# Patient Record
Sex: FEMALE | Race: WHITE | Employment: OTHER | URBAN - METROPOLITAN AREA
[De-identification: names, ages, dates, MRNs, and addresses within clinical notes are randomized per-mention and may not be internally consistent; named-entity substitution may affect disease eponyms.]

---

## 2017-01-17 ENCOUNTER — OFFICE VISIT (OUTPATIENT)
Dept: OBGYN | Facility: CLINIC | Age: 62
End: 2017-01-17
Payer: COMMERCIAL

## 2017-01-17 VITALS
TEMPERATURE: 98.1 F | SYSTOLIC BLOOD PRESSURE: 122 MMHG | DIASTOLIC BLOOD PRESSURE: 60 MMHG | BODY MASS INDEX: 22.9 KG/M2 | WEIGHT: 141.8 LBS

## 2017-01-17 DIAGNOSIS — Z01.419 ENCOUNTER FOR GYNECOLOGICAL EXAMINATION WITHOUT ABNORMAL FINDING: ICD-10-CM

## 2017-01-17 DIAGNOSIS — N95.2 ATROPHIC VAGINITIS: Primary | ICD-10-CM

## 2017-01-17 DIAGNOSIS — N83.201 RIGHT OVARIAN CYST: ICD-10-CM

## 2017-01-17 PROCEDURE — 99396 PREV VISIT EST AGE 40-64: CPT | Performed by: OBSTETRICS & GYNECOLOGY

## 2017-01-17 NOTE — NURSING NOTE
"Chief Complaint   Patient presents with     Gyn Exam     No concerns.       Initial /60 mmHg  Temp(Src) 98.1  F (36.7  C) (Oral)  Wt 141 lb 12.8 oz (64.32 kg) Estimated body mass index is 22.9 kg/(m^2) as calculated from the following:    Height as of 6/30/16: 5' 6\" (1.676 m).    Weight as of this encounter: 141 lb 12.8 oz (64.32 kg).  BP completed using cuff size: regular  Christie Gomez MA      "

## 2017-01-17 NOTE — PROGRESS NOTES
Amber is a 61 year old  who presents for annual exam.   Postmenopausal.  She is having vaginal dryness, longstanding. No vaginal bleeding noted.     Besides routine health maintenance, she has no other health concerns today .  GYNECOLOGIC HISTORY:  She is sexually active with 1 male partner(s) and she is currently in monogamous relationship.    History sexually transmitted infections:No STD history  Estrogen replacement therapy: Yes -  Vaginal only    History of abnormal Pap smear: NO - age 30-65 PAP every 5 years with negative HPV co-testing recommended  Family history of breast CA: No  Family history of uterine/ovarian CA: No  Family history of colon CA: No    HEALTH MAINTENANCE:  Last Mammogram:  . History of abnormal Mammo: No  Pap; (PAP      NIL   2016  PAP      NIL   10/15/2013  PAP      NIL   2010 )    HISTORY:  Obstetric History       T0      TAB1   SAB0   E0   M0   L0       # Outcome Date GA Lbr Jaylon/2nd Weight Sex Delivery Anes PTL Lv   1 TAB      TAB   N        Past Medical History   Diagnosis Date     Irritable bowel syndrome      stable     Past Surgical History   Procedure Laterality Date     C nonspecific procedure       septoplasty and polypectomy     C nonspecific procedure       wisdom teeth     Tubal ligation  age 28     Family History   Problem Relation Age of Onset     DIABETES Mother      B:192     Hypertension Mother      Musculoskeletal Disorder Mother      spine problems     Family History Negative Father      B:192     Psychotic Disorder Brother      bi-polar     C.A.D. Brother      Aortic aneurysm     Alzheimer Disease Mother 84     Social History     Social History     Marital Status:      Spouse Name: Michael     Number of Children: 0     Years of Education: N/A     Occupational History      None      Social History Main Topics     Smoking status: Former Smoker     Smokeless tobacco: Never Used      Comment: quit      Alcohol Use: No     Drug  Use: No     Sexual Activity:     Partners: Male     Other Topics Concern      Service No     Blood Transfusions No     Caffeine Concern No     Occupational Exposure No     Hobby Hazards No     Sleep Concern No     Stress Concern No     Weight Concern No     Special Diet Yes     weight watchers     Back Care No     Exercise Yes     4-5 days per week     Bike Helmet Yes     Seat Belt Yes     Self-Exams Yes     Social History Narrative    Living arrangements - the patient lives with her spouse.        Current outpatient prescriptions:      estradiol (VAGIFEM) 10 MCG TABS, Place 1 tablet (10 mcg) vaginally every 3 days, Disp: 24 tablet, Rfl: 3     oxybutynin (OXYTROL) 3.9 MG/24HR, Place 1 patch onto the skin twice a week Replaces her patch every 4 days., Disp: 24 patch, Rfl: 11     Cyanocobalamin (B-12 SUPER STRENGTH) 5000 MCG/ML LIQD, Place 1 mL under the tongue daily FOR VITAMIN B12 SUPPLEMENTATION, PLEASE PLACE UNDER THE TONGUE, Disp: 2 Bottle, Rfl: PRN     cholecalciferol (VITAMIN D3) 44466 UNITS capsule, Take 1 capsule (50,000 Units) by mouth every 30 days INDICATION:FOR VITAMIN D SUPPLEMENTATION, Disp: 40 capsule, Rfl: 0     calcium-vitamin D-vitamin K (VIACTIV) 500-500-40 MG-UNT-MCG CHEW, Take 1 tablet by mouth daily, Disp: , Rfl:      Acetaminophen (TYLENOL EXTRA STRENGTH PO), Take 1 tablet by mouth as needed 2 bid, Disp: , Rfl:      BENADRYL 25 MG OR CAPS, prn, Disp: , Rfl:      metroNIDAZOLE (FLAGYL) 500 MG tablet, Take 1 tablet (500 mg) by mouth 2 times daily, Disp: 14 tablet, Rfl: 0     Turmeric Curcumin 500 MG CAPS, Take 1 capsule by mouth 2 times daily INDICATION: ARTHRITIS, Disp: 100 capsule, Rfl: PRN     LACTOBACILLUS EXTRA STRENGTH CAPS, Take 1 capsule by mouth daily INDICATION: TO RESTORE GOOD INTESTINAL BACTERIA, Disp: 30 capsule, Rfl: prn  Allergies   Allergen Reactions     Levaquin Hives     Levaquin induced fixed drug eruption.  Biopsy proven by dermatologist.       Cyclobenzaprine Hcl       drowsiness     Seafood Nausea and Vomiting       Past medical, surgical, social and family history were reviewed and updated in EPIC.    ROS:  12 point review of systems negative other than symptoms noted below.      EXAM:  /60 mmHg  Temp(Src) 98.1  F (36.7  C) (Oral)  Wt 141 lb 12.8 oz (64.32 kg)   BMI: Body mass index is 22.9 kg/(m^2).  Constitutional: healthy, alert and no distress  Respiratory: Negative.   Breast: Breasts reveal mild symmetric fibrocystic densities, but there are no dominant, discrete, fixed or suspicious masses found.  Gastrointestinal: Abdomen soft, non-tender, non-distended. No masses, organomegaly  Vulva:  No external lesions, normal female hair distribution, no inguinal adenopathy.    Urethra:  Midline, non-tender, well supported, no discharge  Vagina:  Atrophic, no abnormal discharge, no lesions  Cervix: nontender to palpation  Uterus:  anteverted, smooth contour, without enlargement, mobile, and without tenderness  Ovaries:  No masses appreciated, non-tender, mobile  Rectal Exam: deferred  Musculoskeletal: extremities normal  Skin: no suspicious lesions or rashes  Psychiatric: Affect appropriate, cooperative, mentation appears normal.     COUNSELING:   Reviewed preventive health counseling, as reflected in patient instructions       Healthy diet/nutrition       (Beata)menopause management   reports that she has quit smoking. She has never used smokeless tobacco.        FRAX Risk Assessment  ASSESSMENT:  61 year old  with satisfactory annual exam  (N95.2) Atrophic vaginitis  (primary encounter diagnosis)  Comment:   Plan: Continue vagifem. [place] is still painful, and she has dilators but has not used them consistently. Discussed the use of dilators again, also lubricant and the need for dilator use or intercourse frequently to improve elasticity of the vaginal epithelium, along with continued use of vagifem. Offered referral for pelvic floor physical therapy as well,  she will consider.    (Z01.419) Encounter for gynecological examination without abnormal finding  Comment:   Plan: normal exam. Long history of normal paps.    (N83.201) Right ovarian cyst  Comment:   Plan: Reviewed her ultrasound from Aug 2016. No change from prior ultrasound, showing a small, less than 1.5 cm simple cyst. Reviewed the literature indicating that this has almost no chance of being cancer, and as thus, does not need to be followed regularly. Certainly if she develops new symptoms, consideration to repeating a pelvic ultrasound should be given.    Lolis Solano MD

## 2017-04-05 ENCOUNTER — TELEPHONE (OUTPATIENT)
Dept: INTERNAL MEDICINE | Facility: CLINIC | Age: 62
End: 2017-04-05

## 2017-04-05 ENCOUNTER — MYC MEDICAL ADVICE (OUTPATIENT)
Dept: OBGYN | Facility: CLINIC | Age: 62
End: 2017-04-05

## 2017-04-05 DIAGNOSIS — Z13.820 SCREENING FOR OSTEOPOROSIS: ICD-10-CM

## 2017-04-05 DIAGNOSIS — Z83.3 FAMILY HISTORY OF DIABETES MELLITUS: ICD-10-CM

## 2017-04-05 DIAGNOSIS — E55.9 VITAMIN D DEFICIENCY: Primary | ICD-10-CM

## 2017-04-05 DIAGNOSIS — E78.5 HYPERLIPIDEMIA LDL GOAL <130: ICD-10-CM

## 2017-04-05 DIAGNOSIS — E53.8 VITAMIN B12 DEFICIENCY (NON ANEMIC): ICD-10-CM

## 2017-04-05 DIAGNOSIS — N32.89 BLADDER SPASMS: ICD-10-CM

## 2017-04-05 NOTE — TELEPHONE ENCOUNTER
Reason for Call: Request for an order or referral:    Order or referral being requested: DEXA scan, blood work    Date needed: at your convenience    Has the patient been seen by the PCP for this problem? YES    Additional comments[facility] no orders in chart. Please call pt when orders are placed.    Phone number Patient can be reached at:  Home number on file [phone] (home)    Best Time:  anytime    Can we leave a detailed message on this number?  YES    Call taken on 4/5/2017 at 11:54 AM by Annette Drake

## 2017-04-05 NOTE — TELEPHONE ENCOUNTER
Pt asking for a letter to be sent to her insurance for Oxytrol.  Last time med was ordered by Dr. Barreto at [facility] (I believe).    Oxytol       Last Written Prescription Date:  8/11/16  Last Fill Quantity: 24 patches,   # refills: 11  Last Office Visit with [facility] or  [facility] prescribing provider: 1/17/17 with Dr. Solano  Future Office visit:       Routing refill request to provider for review/approval because:  Drug not on the [facility], [facility] or  [facility] refill protocol or controlled substance    Dr. Solano do you want to take this one or should we send to  Provider.  I don't see a letter from Dr Salcedo.  But I did find an encounter 9/21/15 under Dr Antunez about this.

## 2017-04-06 NOTE — TELEPHONE ENCOUNTER
This came as a prescription refill, but the request is for a letter. I'm assuming as it was renewed in August for a year that she doesn't need a refill?    OK to send letter stating that this is the preferred treatment for her as it works best with the fewest side effects. Thanks.    Lolis Solano MD

## 2017-04-07 ENCOUNTER — MYC MEDICAL ADVICE (OUTPATIENT)
Dept: OBGYN | Facility: CLINIC | Age: 62
End: 2017-04-07

## 2017-04-07 DIAGNOSIS — N32.89 BLADDER SPASMS: ICD-10-CM

## 2017-04-08 ENCOUNTER — MYC MEDICAL ADVICE (OUTPATIENT)
Dept: INTERNAL MEDICINE | Facility: CLINIC | Age: 62
End: 2017-04-08

## 2017-04-10 NOTE — TELEPHONE ENCOUNTER
Per epic med list last rx was written 08/11/16 by PCP. When completing PA requests prescribing provider's name and information. Dr. Solano - Do you want to write rx so that this clinic can do the PA for the pt? Last OV with Dr. Solano: 01/17/17.    Please advise, thanks.    PA started on [email or web address], but no sent to insurance until response received from MD.

## 2017-04-12 ENCOUNTER — MYC MEDICAL ADVICE (OUTPATIENT)
Dept: OBGYN | Facility: CLINIC | Age: 62
End: 2017-04-12

## 2017-04-12 NOTE — TELEPHONE ENCOUNTER
"PA started on [email or web address] requiring provider signature. Attempted to print for MD signature, but unable to. To sign go to: [email or web address], username: [email or web address], password: [email or web address].    To find pt's PA click \"enter key\" on left side of screen and enter: pt's key \"[id number]\". This will bring you to pt's PA. Request for provider signature on the bottom of the page.  "

## 2017-04-12 NOTE — TELEPHONE ENCOUNTER
PA sent to insurance via covermymeds. If any questions about PA submission, contact [employer] at [phone].

## 2017-04-12 NOTE — TELEPHONE ENCOUNTER
Received fax indication med was approved for 04/12/17-04/12/18.    [facility]

## 2017-04-27 ENCOUNTER — RADIANT APPOINTMENT (OUTPATIENT)
Dept: BONE DENSITY | Facility: CLINIC | Age: 62
End: 2017-04-27
Attending: INTERNAL MEDICINE
Payer: COMMERCIAL

## 2017-04-27 DIAGNOSIS — E53.8 VITAMIN B12 DEFICIENCY (NON ANEMIC): ICD-10-CM

## 2017-04-27 DIAGNOSIS — Z13.820 SCREENING FOR OSTEOPOROSIS: ICD-10-CM

## 2017-04-27 DIAGNOSIS — E78.5 HYPERLIPIDEMIA LDL GOAL <130: ICD-10-CM

## 2017-04-27 DIAGNOSIS — E55.9 VITAMIN D DEFICIENCY: ICD-10-CM

## 2017-04-27 LAB
ALBUMIN SERPL-MCNC: 3.5 G/DL (ref 3.4–5)
ALP SERPL-CCNC: 92 U/L (ref 40–150)
ALT SERPL W P-5'-P-CCNC: 14 U/L (ref 0–50)
ANION GAP SERPL CALCULATED.3IONS-SCNC: 11 MMOL/L (ref 3–14)
AST SERPL W P-5'-P-CCNC: 11 U/L (ref 0–45)
BILIRUB SERPL-MCNC: 0.6 MG/DL (ref 0.2–1.3)
BUN SERPL-MCNC: 15 MG/DL (ref 7–30)
CALCIUM SERPL-MCNC: 9.3 MG/DL (ref 8.5–10.1)
CHLORIDE SERPL-SCNC: 107 MMOL/L (ref 94–109)
CHOLEST SERPL-MCNC: 190 MG/DL
CO2 SERPL-SCNC: 25 MMOL/L (ref 20–32)
CREAT SERPL-MCNC: 0.72 MG/DL (ref 0.52–1.04)
DEPRECATED CALCIDIOL+CALCIFEROL SERPL-MC: 56 UG/L (ref 20–75)
ERYTHROCYTE [DISTWIDTH] IN BLOOD BY AUTOMATED COUNT: 13.6 % (ref 10–15)
GFR SERPL CREATININE-BSD FRML MDRD: 81 ML/MIN/1.7M2
GLUCOSE SERPL-MCNC: 85 MG/DL (ref 70–99)
HCT VFR BLD AUTO: 38.9 % (ref 35–47)
HDLC SERPL-MCNC: 52 MG/DL
HGB BLD-MCNC: 12.5 G/DL (ref 11.7–15.7)
LDLC SERPL CALC-MCNC: 119 MG/DL
MCH RBC QN AUTO: 29.2 PG (ref 26.5–33)
MCHC RBC AUTO-ENTMCNC: 32.1 G/DL (ref 31.5–36.5)
MCV RBC AUTO: 91 FL (ref 78–100)
NONHDLC SERPL-MCNC: 138 MG/DL
PLATELET # BLD AUTO: 286 10E9/L (ref 150–450)
POTASSIUM SERPL-SCNC: 4.2 MMOL/L (ref 3.4–5.3)
PROT SERPL-MCNC: 7.3 G/DL (ref 6.8–8.8)
RBC # BLD AUTO: 4.28 10E12/L (ref 3.8–5.2)
SODIUM SERPL-SCNC: 143 MMOL/L (ref 133–144)
T4 FREE SERPL-MCNC: 0.97 NG/DL (ref 0.76–1.46)
TRIGL SERPL-MCNC: 95 MG/DL
TSH SERPL DL<=0.05 MIU/L-ACNC: 4.49 MU/L (ref 0.4–4)
WBC # BLD AUTO: 6.5 10E9/L (ref 4–11)

## 2017-04-27 PROCEDURE — 36415 COLL VENOUS BLD VENIPUNCTURE: CPT | Performed by: INTERNAL MEDICINE

## 2017-04-27 PROCEDURE — 80053 COMPREHEN METABOLIC PANEL: CPT | Performed by: INTERNAL MEDICINE

## 2017-04-27 PROCEDURE — 82306 VITAMIN D 25 HYDROXY: CPT | Performed by: INTERNAL MEDICINE

## 2017-04-27 PROCEDURE — 85027 COMPLETE CBC AUTOMATED: CPT | Performed by: INTERNAL MEDICINE

## 2017-04-27 PROCEDURE — 84443 ASSAY THYROID STIM HORMONE: CPT | Performed by: INTERNAL MEDICINE

## 2017-04-27 PROCEDURE — 80061 LIPID PANEL: CPT | Performed by: INTERNAL MEDICINE

## 2017-04-27 PROCEDURE — 77080 DXA BONE DENSITY AXIAL: CPT | Performed by: INTERNAL MEDICINE

## 2017-04-27 PROCEDURE — 84439 ASSAY OF FREE THYROXINE: CPT | Performed by: INTERNAL MEDICINE

## 2017-05-01 ENCOUNTER — MYC MEDICAL ADVICE (OUTPATIENT)
Dept: INTERNAL MEDICINE | Facility: CLINIC | Age: 62
End: 2017-05-01

## 2017-05-01 NOTE — PROGRESS NOTES
Dear Amber,   Your recent test results were reviewed and are within acceptable limits except for your TSH (thyroid-stimulating hormone) which is a marker of how well the thyroid gland is functioning. It is slightly high but your actual thyroid hormone level is within normal limits. Given these findings the way to handle this would be to continue to monitor your thyroid levels but if you're experiencing any symptoms of fatigue, weight gain or constipation it may be a sign that your thyroid is not functioning as well as it should and you may require low-dose thyroid replacement. Please let me know if this is the case, and do not hesitate to contact me with any questions or concerns.  Stay healthy!    Regards,  Dr. Susan Virk [facility]

## 2017-05-03 NOTE — PROGRESS NOTES
Dear Amber,   Your recent Dexa scan results were reviewed and thinning of the bones - osteopenia. Please continue with current treatment ie Calcium and Vitamin D, weight bearing exercise , and follow up plans as discussed.  A repeat DEXA is recommended in 2 years.      Stay healthy!  Dr. SHELBIE Kline

## 2017-08-18 DIAGNOSIS — Z79.890 POSTMENOPAUSAL HRT (HORMONE REPLACEMENT THERAPY): ICD-10-CM

## 2017-08-18 DIAGNOSIS — N94.10 FEMALE DYSPAREUNIA: ICD-10-CM

## 2017-08-18 RX ORDER — ESTRADIOL 10 UG/1
TABLET VAGINAL
Qty: 16 TABLET | Refills: 2 | Status: SHIPPED | OUTPATIENT
Start: 2017-08-18 | End: 2018-01-10

## 2017-08-18 NOTE — TELEPHONE ENCOUNTER
Yuvafem vaginal      Last Written Prescription Date:  na  Last Fill Quantity: na,   # refills: na  Last Office Visit with [facility] or [facility] prescribing provider: 06/30/16  Future Office visit:   0    Routing refill request to provider for review/approval because:  Drug not active on patient's medication list

## 2017-12-05 ENCOUNTER — HOSPITAL ENCOUNTER (OUTPATIENT)
Dept: MAMMOGRAPHY | Facility: CLINIC | Age: 62
Discharge: HOME | End: 2017-12-05
Attending: OBSTETRICS & GYNECOLOGY | Admitting: OBSTETRICS & GYNECOLOGY
Payer: COMMERCIAL

## 2017-12-05 DIAGNOSIS — Z12.31 ENCOUNTER FOR SCREENING MAMMOGRAM FOR HIGH-RISK PATIENT: ICD-10-CM

## 2017-12-05 PROCEDURE — 77063 BREAST TOMOSYNTHESIS BI: CPT

## 2017-12-05 PROCEDURE — G0202 SCR MAMMO BI INCL CAD: HCPCS

## 2018-01-10 DIAGNOSIS — Z79.890 POSTMENOPAUSAL HRT (HORMONE REPLACEMENT THERAPY): ICD-10-CM

## 2018-01-10 DIAGNOSIS — N94.10 FEMALE DYSPAREUNIA: ICD-10-CM

## 2018-01-10 NOTE — TELEPHONE ENCOUNTER
Requested Prescriptions   Pending Prescriptions Disp Refills     estradiol (YUVAFEM) 10 MCG TABS vaginal tablet        Last Written Prescription Date:  08/18/17  Last Fill Quantity: 16,  # refills: 2   Last [facility] Visit with [facility] or [facility]:  06/30/16   Future Office Visit:  01/18/18  Next 5 appointments (look out 90 days)     Jan 18, 2018  9:30 AM CST   PHYSICAL with Lolis Solano MD   [facility]    [address]  [facility] [id number] [phone]                16 tablet 2    There is no refill protocol information for this order      Last mammo 12/05/17

## 2018-01-17 RX ORDER — ESTRADIOL 10 UG/1
INSERT VAGINAL
Qty: 16 TABLET | Refills: 0 | Status: SHIPPED | OUTPATIENT
Start: 2018-01-17 | End: 2018-03-03

## 2018-01-18 ENCOUNTER — OFFICE VISIT (OUTPATIENT)
Dept: OBGYN | Facility: CLINIC | Age: 63
End: 2018-01-18
Payer: COMMERCIAL

## 2018-01-18 VITALS
DIASTOLIC BLOOD PRESSURE: 58 MMHG | HEART RATE: 60 BPM | WEIGHT: 142 LBS | SYSTOLIC BLOOD PRESSURE: 100 MMHG | HEIGHT: 66 IN | BODY MASS INDEX: 22.82 KG/M2

## 2018-01-18 DIAGNOSIS — N95.2 ATROPHIC VAGINITIS: Primary | ICD-10-CM

## 2018-01-18 DIAGNOSIS — Z01.419 ENCOUNTER FOR GYNECOLOGICAL EXAMINATION WITHOUT ABNORMAL FINDING: ICD-10-CM

## 2018-01-18 PROCEDURE — 99214 OFFICE O/P EST MOD 30 MIN: CPT | Performed by: OBSTETRICS & GYNECOLOGY

## 2018-01-18 RX ORDER — ESTRADIOL 0.1 MG/G
CREAM VAGINAL
Qty: 42.5 G | Refills: 1 | Status: SHIPPED | OUTPATIENT
Start: 2018-01-18 | End: 2018-05-16

## 2018-01-18 NOTE — PROGRESS NOTES
Amber is a 62 year old  who presents for annual exam.   Postmenopausal.  She is having vaginal dryness and pain with intercourse, not able to have penetrative sex at this point. No vaginal bleeding noted.     Besides routine health maintenance,  she would like to discuss atrophic vaginitis and dyspareunia. She would really like to be able to have intercourse again with her . She has tried vagifem twice a week and maintains that now, but hasn't noticed much improvement although also things are not worse. She has used dilators but finds this difficult and is just really uncomfortable with the whole idea.  GYNECOLOGIC HISTORY:  She is sexually active with 1 male partner(s) and she is currently in monogamous relationship.    History sexually transmitted infections:No STD history  Estrogen replacement therapy: Yes -  Vaginal only    History of abnormal Pap smear: NO - age 30-65 PAP every 5 years with negative HPV co-testing recommended  Family history of breast CA: No  Family history of uterine/ovarian CA: No  Family history of colon CA: No    HEALTH MAINTENANCE:  Last Mammogram: up to date . History of abnormal Mammo: No  Pap; (  Lab Results   Component Value Date    PAP NIL 2016    PAP NIL 10/15/2013    PAP NIL 2010    )    HISTORY:  Obstetric History       T0      L0     SAB0   TAB1   Ectopic0   Multiple0   Live Births0       # Outcome Date GA Lbr Jaylon/2nd Weight Sex Delivery Anes PTL Lv   1 TAB      TAB   DEC        Past Medical History:   Diagnosis Date     Irritable bowel syndrome     stable     Past Surgical History:   Procedure Laterality Date     C NONSPECIFIC PROCEDURE      septoplasty and polypectomy     C NONSPECIFIC PROCEDURE      wisdom teeth     TUBAL LIGATION  age 28     Family History   Problem Relation Age of Onset     DIABETES Mother      B:192     Hypertension Mother      Musculoskeletal Disorder Mother      spine problems     Alzheimer Disease Mother 84      Family History Negative Father      B:1927     Psychotic Disorder Brother      bi-polar     C.A.D. Brother      Aortic aneurysm     Social History     Social History     Marital status:      Spouse name: Michael     Number of children: 0     Years of education: N/A     Occupational History      None      Social History Main Topics     Smoking status: Former Smoker     Smokeless tobacco: Never Used      Comment: quit 1980     Alcohol use No     Drug use: No     Sexual activity: Yes     Partners: Male     Other Topics Concern      Service No     Blood Transfusions No     Caffeine Concern No     Occupational Exposure No     Hobby Hazards No     Sleep Concern No     Stress Concern No     Weight Concern No     Special Diet Yes     weight watchers     Back Care No     Exercise Yes     4-5 days per week     Bike Helmet Yes     Seat Belt Yes     Self-Exams Yes     Social History Narrative    Living arrangements - the patient lives with her spouse.        Current Outpatient Prescriptions:      estradiol (YUVAFEM) 10 MCG TABS vaginal tablet, place 1 tablet vaginally every 3 days, Disp: 16 tablet, Rfl: 0     oxybutynin (OXYTROL) 3.9 MG/24HR BIW patch, Place 1 patch onto the skin twice a week Replaces her patch every 4 days., Disp: 24 patch, Rfl: 11     Cyanocobalamin (B-12 SUPER STRENGTH) 5000 MCG/ML LIQD, Place 1 mL under the tongue daily FOR VITAMIN B12 SUPPLEMENTATION, PLEASE PLACE UNDER THE TONGUE, Disp: 2 Bottle, Rfl: PRN     cholecalciferol (VITAMIN D3) 21594 UNITS capsule, Take 1 capsule (50,000 Units) by mouth every 30 days INDICATION:FOR VITAMIN D SUPPLEMENTATION, Disp: 40 capsule, Rfl: 0     calcium-vitamin D-vitamin K (VIACTIV) 500-500-40 MG-UNT-MCG CHEW, Take 1 tablet by mouth daily, Disp: , Rfl:      Acetaminophen (TYLENOL EXTRA STRENGTH PO), Take 1 tablet by mouth as needed 2 bid, Disp: , Rfl:      metroNIDAZOLE (FLAGYL) 500 MG tablet, Take 1 tablet (500 mg) by mouth 2 times daily (Patient not  "taking: Reported on 1/18/2018), Disp: 14 tablet, Rfl: 0     Turmeric Curcumin 500 MG CAPS, Take 1 capsule by mouth 2 times daily INDICATION: ARTHRITIS (Patient not taking: Reported on 1/18/2018), Disp: 100 capsule, Rfl: PRN     LACTOBACILLUS EXTRA STRENGTH CAPS, Take 1 capsule by mouth daily INDICATION: TO RESTORE GOOD INTESTINAL BACTERIA (Patient not taking: Reported on 1/18/2018), Disp: 30 capsule, Rfl: prn     BENADRYL 25 MG OR CAPS, prn (Patient not taking: Reported on 1/18/2018), Disp: , Rfl:   Allergies   Allergen Reactions     Levaquin Hives     Levaquin induced fixed drug eruption.  Biopsy proven by dermatologist.       Cyclobenzaprine Hcl      drowsiness     Seafood Nausea and Vomiting       Past medical, surgical, social and family history were reviewed and updated in EPIC.    ROS:  12 point review of systems negative other than symptoms noted below.      EXAM:  /58 (BP Location: Right arm, Patient Position: Sitting, Cuff Size: Adult Regular)  Pulse 60  Ht 5' 6\" (1.676 m)  Wt 142 lb (64.4 kg)  Breastfeeding? No  BMI 22.92 kg/m2   BMI: Body mass index is 22.92 kg/(m^2).  Constitutional: healthy, alert and no distress  Head: Normocephalic. No masses, lesions, tenderness or abnormalities  Neck: Neck supple. Trachea midline. No adenopathy. Thyroid symmetric, normal size.   Cardiovascular:  No edema   Respiratory: Negative. Breathing is unlabored.  Breast: No nodularity, asymmetry or nipple discharge bilaterally.  Gastrointestinal: Abdomen soft, non-tender, non-distended. No masses, organomegaly  Vulva:  No external lesions, normal female hair distribution, no inguinal adenopathy.    Urethra:  Midline, non-tender, well supported, no discharge  Vagina:  Atrophic, no abnormal discharge, no lesions  Cervix: no lesions, no discharge  Uterus:  anteverted, smooth contour, without enlargement, mobile, and without tenderness  Ovaries:  No masses appreciated, non-tender, mobile  Rectal Exam: " deferred  Musculoskeletal: extremities normal  Skin: no suspicious lesions or rashes  Psychiatric: Affect appropriate, cooperative, mentation appears normal.     COUNSELING:   Reviewed preventive health counseling, as reflected in patient instructions       (Beata)menopause management       Advance Care Planning   reports that she has quit smoking. She has never used smokeless tobacco.          FRAX Risk Assessment  ASSESSMENT:  62 year old  with satisfactory annual exam  (N95.2) Atrophic vaginitis  (primary encounter diagnosis)  Comment:   Plan: estradiol (ESTRACE VAGINAL) 0.1 MG/GM cream        Will add estrace daily for 4 weeks to ramp up estrogen in the vaginal area, and then go back to vagifem but at 3 times a week instead of twice.    Also discussed options of physical therapy for help with dilators and for other ideas and laser treatment or radiofrequency treatments like Rajani Karime or Thermiva. These names were given to her as well. She would like to begin with medications first.    (Z01.419) Encounter for gynecological examination without abnormal finding  Comment:   Plan: History of normal paps--will not need further paps as she will be due in  after age 63.    Lolis Solano MD

## 2018-01-18 NOTE — NURSING NOTE
"Chief Complaint   Patient presents with     Physical     Annual exam. Pap up to date.        Initial /58 (BP Location: Right arm, Patient Position: Sitting, Cuff Size: Adult Regular)  Pulse 60  Ht 5' 6\" (1.676 m)  Wt 142 lb (64.4 kg)  Breastfeeding? No  BMI 22.92 kg/m2 Estimated body mass index is 22.92 kg/(m^2) as calculated from the following:    Height as of this encounter: 5' 6\" (1.676 m).    Weight as of this encounter: 142 lb (64.4 kg).  BP completed using cuff size: regular        The following HM Due: NONE      The following patient reported/Care Every where data was sent to:  P ABSTRACT QUALITY INITIATIVES [[id number]]        patient has appointment for today. Lydia Mcqueen LPN                "

## 2018-01-18 NOTE — MR AVS SNAPSHOT
"              After Visit Summary   1/18/2018    Amber Black    MRN: [id number]           Patient Information     Date Of Birth          1955        Visit Information        Provider Department      1/18/2018 9:30 AM Lolis Solano MD [facility]        Today's Diagnoses     Atrophic vaginitis    -  1    Encounter for gynecological examination without abnormal finding           Follow-ups after your visit        Who to contact     If you have questions or need follow up information about today's clinic visit or your schedule please contact [facility] directly at [phone].  Normal or non-critical lab and imaging results will be communicated to you by [employer], letter or phone within 4 business days after the clinic has received the results. If you do not hear from us within 7 days, please contact the clinic through [employer] or phone. If you have a critical or abnormal lab result, we will notify you by phone as soon as possible.  Submit refill requests through [employer] or call your pharmacy and they will forward the refill request to us. Please allow 3 business days for your refill to be completed.          Additional Information About Your Visit        MyChart Information     [employer] gives you secure access to your electronic health record. If you see a primary care provider, you can also send messages to your care team and make appointments. If you have questions, please call your primary care clinic.  If you do not have a primary care provider, please call [phone] and they will assist you.        Care EveryWhere ID     This is your Care EveryWhere ID. This could be used by other organizations to access your [place] medical records  [id number]        Your Vitals Were     Pulse Height Breastfeeding? BMI (Body Mass Index)          60 5' 6\" (1.676 m) No 22.92 kg/m2         Blood Pressure from Last 3 Encounters:   01/18/18 100/58   01/17/17 122/60   06/30/16 110/70    " Weight from Last 3 Encounters:   01/18/18 142 lb (64.4 kg)   01/17/17 141 lb 12.8 oz (64.3 kg)   06/30/16 141 lb 11.2 oz (64.3 kg)              Today, you had the following     No orders found for display         Today's Medication Changes          These changes are accurate as of: 1/18/18  2:23 PM.  If you have any questions, ask your nurse or doctor.               These medicines have changed or have updated prescriptions.        Dose/Directions    * estradiol 10 MCG Tabs vaginal tablet   Commonly known as:  YUVAFEM   This may have changed:  Another medication with the same name was added. Make sure you understand how and when to take each.   Used for:  Postmenopausal HRT (hormone replacement therapy), Female dyspareunia   Changed by:  Susan Davis MD        place 1 tablet vaginally every 3 days   Quantity:  16 tablet   Refills:  0       * estradiol 0.1 MG/GM cream   Commonly known as:  ESTRACE VAGINAL   This may have changed:  You were already taking a medication with the same name, and this prescription was added. Make sure you understand how and when to take each.   Used for:  Atrophic vaginitis   Changed by:  Lolis Solano MD        1 gm pv nightly at bedtime for 4 weeks.   Quantity:  42.5 g   Refills:  1       * Notice:  This list has 2 medication(s) that are the same as other medications prescribed for you. Read the directions carefully, and ask your doctor or other care provider to review them with you.         Where to get your medicines      These medications were sent to [id number] - [place], [place] - [address]  [address] [place] [address]     Phone:  [phone]     estradiol 0.1 MG/GM cream                Primary Care Provider Office Phone # Fax #    Susan Davis [phone]       XXX RESIGNED XXX  [facility] [address]        Equal Access to Services     MARCELA MELENDEZ AH: Holly Garcia, elijah vale, lionel south,  romulo melgar fran lorenzo'aan ah. So [facility] [phone].    ATENCIÓN: Si habla christiano, tiene a sal disposición servicios gratuitos de asistencia lingüística. Monik smith [phone].    We comply with applicable federal civil rights laws and [place] laws. We do not discriminate on the basis of race, color, national origin, age, disability, sex, sexual orientation, or gender identity.            Thank you!     Thank you for choosing [facility]  for your care. Our goal is always to provide you with excellent care. Hearing back from our patients is one way we can continue to improve our services. Please take a few minutes to complete the written survey that you may receive in the mail after your visit with us. Thank you!             Your Updated Medication List - Protect others around you: Learn how to safely use, store and throw away your medicines at www.disposemymeds.org.          This list is accurate as of: 1/18/18  2:23 PM.  Always use your most recent med list.                   Brand Name Dispense Instructions for use Diagnosis    BENADRYL 25 MG capsule   Generic drug:  diphenhydrAMINE      prn        calcium-vitamin D-vitamin K 500-500-40 MG-UNT-MCG Chew    VIACTIV     Take 1 tablet by mouth daily        cholecalciferol 12466 UNITS capsule    VITAMIN D3    40 capsule    Take 1 capsule (50,000 Units) by mouth every 30 days INDICATION:FOR VITAMIN D SUPPLEMENTATION    Vitamin D deficiency       Cyanocobalamin 5000 MCG/ML Liqd    B-12 SUPER STRENGTH    2 Bottle    Place 1 mL under the tongue daily FOR VITAMIN B12 SUPPLEMENTATION, PLEASE PLACE UNDER THE TONGUE    Vitamin B12 deficiency (non anemic)       * estradiol 10 MCG Tabs vaginal tablet    YUVAFEM    16 tablet    place 1 tablet vaginally every 3 days    Postmenopausal HRT (hormone replacement therapy), Female dyspareunia       * estradiol 0.1 MG/GM cream    ESTRACE VAGINAL    42.5 g    1 gm pv nightly at bedtime for 4 weeks.     Atrophic vaginitis       LACTOBACILLUS EXTRA STRENGTH Caps     30 capsule    Take 1 capsule by mouth daily INDICATION: TO RESTORE GOOD INTESTINAL BACTERIA    Flatulence, eructation, and gas pain       metroNIDAZOLE 500 MG tablet    FLAGYL    14 tablet    Take 1 tablet (500 mg) by mouth 2 times daily    Vaginal discharge       oxybutynin 3.9 MG/24HR BIW patch    OXYTROL    24 patch    Place 1 patch onto the skin twice a week Replaces her patch every 4 days.    Bladder spasms       Turmeric Curcumin 500 MG Caps     100 capsule    Take 1 capsule by mouth 2 times daily INDICATION: ARTHRITIS    Primary osteoarthritis, unspecified site       TYLENOL EXTRA STRENGTH PO      Take 1 tablet by mouth as needed 2 bid        * Notice:  This list has 2 medication(s) that are the same as other medications prescribed for you. Read the directions carefully, and ask your doctor or other care provider to review them with you.

## 2018-01-23 ENCOUNTER — MYC MEDICAL ADVICE (OUTPATIENT)
Dept: OBGYN | Facility: CLINIC | Age: 63
End: 2018-01-23

## 2018-01-23 NOTE — TELEPHONE ENCOUNTER
This is not a really common side effect, but I have had people have this symptom. For now, I would decrease to every other day. If still bothersome, she can restart the vagifem at three times a week and use the estrogen cream just at the opening daily--a pea-sized amount or so. That should help.    Lolis Solano MD

## 2018-03-03 DIAGNOSIS — Z79.890 POSTMENOPAUSAL HRT (HORMONE REPLACEMENT THERAPY): ICD-10-CM

## 2018-03-03 DIAGNOSIS — N94.10 FEMALE DYSPAREUNIA: ICD-10-CM

## 2018-03-05 RX ORDER — ESTRADIOL 10 UG/1
INSERT VAGINAL
Qty: 16 TABLET | Refills: 1 | Status: SHIPPED | OUTPATIENT
Start: 2018-03-05 | End: 2018-06-19

## 2018-03-05 NOTE — TELEPHONE ENCOUNTER
"Requested Prescriptions   Pending Prescriptions Disp Refills     estradiol (VAGIFEM) 10 MCG TABS vaginal tablet [Pharmacy Med Name: Estradiol Vaginal Tablet 10 MCG] 16 tablet 0     Sig: place 1 tablet vaginally every 3 days    Hormone Replacement Therapy Passed    3/5/2018  1:48 PM       Passed - Blood pressure under 140/90 in past 12 months    BP Readings from Last 3 Encounters:   01/18/18 100/58   01/17/17 122/60   06/30/16 110/70                Passed - Recent (12 mo) or future (30 days) visit within the authorizing provider's specialty    Patient had office visit in the last year or has a visit in the next 30 days with authorizing provider.  See \"Patient Info\" tab in inbasket, or \"Choose Columns\" in Meds & Orders section of the refill encounter.            Passed - Patient has mammogram in past 2 years on file if age 50-75       Passed - Patient is 18 years of age or older       Passed - No active pregnancy on record       Passed - No positive pregnancy test on record in past 12 months        rx approved.    Marcia Pcek RN      "

## 2018-04-17 DIAGNOSIS — N32.89 BLADDER SPASMS: ICD-10-CM

## 2018-04-17 RX ORDER — OXYBUTYNIN 3.9 MG/D
PATCH TRANSDERMAL
Qty: 24 PATCH | Refills: 8 | Status: SHIPPED | OUTPATIENT
Start: 2018-04-17

## 2018-04-17 NOTE — TELEPHONE ENCOUNTER
"Requested Prescriptions   Pending Prescriptions Disp Refills     OXYTROL 3.9 MG/24HR BIW patch [Pharmacy Med Name: Oxytrol Transdermal Patch Twice Weekly 3.9 MG/24HR]  10     Sig: PLACE 1 PATCH ONTO THE SKIN TWICE A WEEK (EVERY 4 DAYS)    Muscarinic Antagonists (Urinary Incontinence Agents) Passed    4/17/2018  7:00 AM       Passed - Recent (12 mo) or future (30 days) visit within the authorizing provider's specialty    Patient had office visit in the last 12 months or has a visit in the next 30 days with authorizing provider or within the authorizing provider's specialty.  See \"Patient Info\" tab in inbasket, or \"Choose Columns\" in Meds & Orders section of the refill encounter.           Passed - Medication is Oxybutynin and patient is 5 years of age or older       Passed - Patient does not have a diagnosis of glaucoma on the problem list    If glaucoma diagnosis is new, refer refill to physician.         Passed - Patient is 18 years of age or older        Last Written Prescription Date:  4/13/17  Last Fill Quantity: 24,  # refills: 11   Last office visit: 1/18/2018 with prescribing provider:  1/18/18   Future Office Visit:      "

## 2018-04-17 NOTE — TELEPHONE ENCOUNTER
"Requested Prescriptions   Pending Prescriptions Disp Refills     OXYTROL 3.9 MG/24HR BIW patch [Pharmacy Med Name: Oxytrol Transdermal Patch Twice Weekly 3.9 MG/24HR] 24 patch 8     Sig: PLACE 1 PATCH ONTO THE SKIN TWICE A WEEK (EVERY 4 DAYS)    Muscarinic Antagonists (Urinary Incontinence Agents) Passed    4/17/2018 11:32 AM       Passed - Recent (12 mo) or future (30 days) visit within the authorizing provider's specialty    Patient had office visit in the last 12 months or has a visit in the next 30 days with authorizing provider or within the authorizing provider's specialty.  See \"Patient Info\" tab in inbasket, or \"Choose Columns\" in Meds & Orders section of the refill encounter.           Passed - Medication is Oxybutynin and patient is 5 years of age or older       Passed - Patient does not have a diagnosis of glaucoma on the problem list    If glaucoma diagnosis is new, refer refill to physician.         Passed - Patient is 18 years of age or older        rx approved.      Marcia Peck RN    "

## 2018-04-18 ENCOUNTER — TELEPHONE (OUTPATIENT)
Dept: OBGYN | Facility: CLINIC | Age: 63
End: 2018-04-18

## 2018-04-18 NOTE — TELEPHONE ENCOUNTER
Central Prior Authorization Team   Phone: [phone]    ePA  Manually faxed additional info form to [employer] ([phone]) for PA.   Awaiting determination.

## 2018-04-19 DIAGNOSIS — N32.89 BLADDER SPASMS: ICD-10-CM

## 2018-04-19 RX ORDER — OXYBUTYNIN 3.9 MG/D
PATCH TRANSDERMAL
Refills: 10 | OUTPATIENT
Start: 2018-04-19

## 2018-04-20 NOTE — TELEPHONE ENCOUNTER
Central Prior Authorization Team   Phone: [phone]    PRIOR AUTHORIZATION DENIED - epa    Medication: OXYTROL 3.9 MG/24HR BIW patch - epa denied    Denial Date: 4/19/2018    Denial Rational:       Appeal Information: NA - will document when receive denial letter via fax.

## 2018-04-24 NOTE — TELEPHONE ENCOUNTER
Can we see if the patient is comfortable trying the (identical) over the counter patch? Thanks.  Lolis Solano MD

## 2018-05-16 ENCOUNTER — OFFICE VISIT (OUTPATIENT)
Dept: INTERNAL MEDICINE | Facility: CLINIC | Age: 63
End: 2018-05-16
Payer: COMMERCIAL

## 2018-05-16 VITALS
SYSTOLIC BLOOD PRESSURE: 104 MMHG | OXYGEN SATURATION: 96 % | RESPIRATION RATE: 16 BRPM | HEART RATE: 78 BPM | DIASTOLIC BLOOD PRESSURE: 66 MMHG | WEIGHT: 141.4 LBS | TEMPERATURE: 98 F | BODY MASS INDEX: 22.82 KG/M2

## 2018-05-16 DIAGNOSIS — Z13.220 SCREENING FOR CHOLESTEROL LEVEL: ICD-10-CM

## 2018-05-16 DIAGNOSIS — Z13.1 SCREENING FOR DIABETES MELLITUS: ICD-10-CM

## 2018-05-16 DIAGNOSIS — Z76.89 ENCOUNTER TO ESTABLISH CARE: Primary | ICD-10-CM

## 2018-05-16 DIAGNOSIS — Z11.4 SCREENING FOR HIV (HUMAN IMMUNODEFICIENCY VIRUS): ICD-10-CM

## 2018-05-16 PROCEDURE — 80061 LIPID PANEL: CPT | Performed by: PHYSICIAN ASSISTANT

## 2018-05-16 PROCEDURE — 36415 COLL VENOUS BLD VENIPUNCTURE: CPT | Performed by: PHYSICIAN ASSISTANT

## 2018-05-16 PROCEDURE — 99213 OFFICE O/P EST LOW 20 MIN: CPT | Performed by: PHYSICIAN ASSISTANT

## 2018-05-16 PROCEDURE — 87389 HIV-1 AG W/HIV-1&-2 AB AG IA: CPT | Performed by: PHYSICIAN ASSISTANT

## 2018-05-16 PROCEDURE — 80053 COMPREHEN METABOLIC PANEL: CPT | Performed by: PHYSICIAN ASSISTANT

## 2018-05-16 ASSESSMENT — PAIN SCALES - GENERAL: PAINLEVEL: MILD PAIN (3)

## 2018-05-16 NOTE — MR AVS SNAPSHOT
After Visit Summary   5/16/2018    Amber Black    MRN: [id number]           Patient Information     Date Of Birth          1955        Visit Information        Provider Department      5/16/2018 10:00 AM Felisha Merchant PA-C [facility]        Today's Diagnoses     Screening for diabetes mellitus    -  1    Screening for cholesterol level        Screening for HIV (human immunodeficiency virus)          Care Instructions    Calcium 1200 mg vitamin D 800 iu           Follow-ups after your visit        Who to contact     If you have questions or need follow up information about today's clinic visit or your schedule please contact [facility] directly at [phone].  Normal or non-critical lab and imaging results will be communicated to you by MyChart, letter or phone within 4 business days after the clinic has received the results. If you do not hear from us within 7 days, please contact the clinic through [employer] or phone. If you have a critical or abnormal lab result, we will notify you by phone as soon as possible.  Submit refill requests through [employer] or call your pharmacy and they will forward the refill request to us. Please allow 3 business days for your refill to be completed.          Additional Information About Your Visit        MyChart Information     [employer] gives you secure access to your electronic health record. If you see a primary care provider, you can also send messages to your care team and make appointments. If you have questions, please call your primary care clinic.  If you do not have a primary care provider, please call [phone] and they will assist you.        Care EveryWhere ID     This is your Care EveryWhere ID. This could be used by other organizations to access your [place] medical records  [id number]        Your Vitals Were     Pulse Temperature Respirations Pulse Oximetry BMI (Body Mass Index)       78  98  F (36.7  C) (Oral) 16 96% 22.82 kg/m2        Blood Pressure from Last 3 Encounters:   05/16/18 104/66   01/18/18 100/58   01/17/17 122/60    Weight from Last 3 Encounters:   05/16/18 141 lb 6.4 oz (64.1 kg)   01/18/18 142 lb (64.4 kg)   01/17/17 141 lb 12.8 oz (64.3 kg)              We Performed the Following     Comprehensive metabolic panel     HIV Antigen Antibody Combo     Lipid panel reflex to direct LDL Fasting        Primary Care Provider Fax #    Physician No Ref-Primary [phone]       No address on file        Equal Access to Services     TERESITA MELENDEZ : Holly Garcia, elijah vale, lionel newmanmaangelina south, romulo guerrero . So [facility] [phone].    ATENCIÓN: Si habla español, tiene a sal disposición servicios gratuitos de asistencia lingüística. Ll[facility] [phone].    We comply with applicable federal civil rights laws and [place] laws. We do not discriminate on the basis of race, color, national origin, age, disability, sex, sexual orientation, or gender identity.            Thank you!     Thank you for choosing [facility]  for your care. Our goal is always to provide you with excellent care. Hearing back from our patients is one way we can continue to improve our services. Please take a few minutes to complete the written survey that you may receive in the mail after your visit with us. Thank you!             Your Updated Medication List - Protect others around you: Learn how to safely use, store and throw away your medicines at www.disposemymeds.org.          This list is accurate as of 5/16/18 11:09 AM.  Always use your most recent med list.                   Brand Name Dispense Instructions for use Diagnosis    BENADRYL 25 MG capsule   Generic drug:  diphenhydrAMINE      prn        estradiol 10 MCG Tabs vaginal tablet    VAGIFEM    16 tablet    place 1 tablet vaginally every 3 days    Postmenopausal HRT (hormone  replacement therapy), Female dyspareunia       OXYTROL 3.9 MG/24HR BIW patch   Generic drug:  oxybutynin     24 patch    PLACE 1 PATCH ONTO THE SKIN TWICE A WEEK (EVERY 4 DAYS)    Bladder spasms       TYLENOL EXTRA STRENGTH PO      Take 1 tablet by mouth as needed 2 bid

## 2018-05-16 NOTE — PROGRESS NOTES
SUBJECTIVE:   Amber Black is a 62 year old female who presents to clinic today for the following health issues:    Chief Complaint   Patient presents with     Establish Care     from [place]  Pt states that her blood work is normally done every 4-5 months and she is here to have her blood work done - BMP and Cholesterold also tetanus     Imm/Inj     Tetanus and Shindrex     Chronic medication conditions:  - osteopenia: Dexa UTD, not currently on vitamin D or calcium   - atrophic vaginitis follows with OBGYN, on oxytrol and vagifem   - mild elevation in cholesterol not requiring medication, last check 1 year ago     Health maintenance:   - exercises routinely and reports healthy diet, normal BMI  - due for tetanus, shingrix, and pneumo in 2 weeks   - HIV screen, due   - mammogram, UTD  - colonscopy, UTD  - pap smear, UTD    Problem list and histories reviewed & adjusted, as indicated.  Additional history: as documented    ROS: 7 point ROS is negative     Reviewed and updated as needed this visit by clinical staff  Tobacco  Allergies  Meds  Surg Hx  Fam Hx  Soc Hx      Reviewed and updated as needed this visit by Provider  Tobacco  Meds  Surg Hx  Fam Hx  Soc Hx      OBJECTIVE:     /66  Pulse 78  Temp 98  F (36.7  C) (Oral)  Resp 16  Wt 141 lb 6.4 oz (64.1 kg)  SpO2 96%  BMI 22.82 kg/m2  Body mass index is 22.82 kg/(m^2).  GENERAL: healthy, alert and no distress  RESP: lungs clear to auscultation - no rales, rhonchi or wheezes  CV: regular rate and rhythm, normal S1 S2, no S3 or S4, no murmur, click or rub, no peripheral edema and peripheral pulses strong  ABDOMEN: soft, nontender, no hepatosplenomegaly, no masses and bowel sounds normal  MS: no gross musculoskeletal defects noted, no edema  PSYCH: mentation appears normal, affect normal/bright    ASSESSMENT/PLAN:     1. Encounter to establish care  - normal exam  - reviewed healthy maintenance   - pt returning for immunization as she is a  few weeks early on pneumo     2. Screening for diabetes mellitus  - Comprehensive metabolic panel    3. Screening for cholesterol level  - Lipid panel reflex to direct LDL Fasting    4. Screening for HIV (human immunodeficiency virus)  - HIV Antigen Antibody Combo    Annual follow up, sooner if concerns.   Pt agrees to above plan and all questions are answered.     Felisha Merchant PA-C  [facility]

## 2018-05-17 LAB
ALBUMIN SERPL-MCNC: 4 G/DL (ref 3.4–5)
ALP SERPL-CCNC: 77 U/L (ref 40–150)
ALT SERPL W P-5'-P-CCNC: 19 U/L (ref 0–50)
ANION GAP SERPL CALCULATED.3IONS-SCNC: 6 MMOL/L (ref 3–14)
AST SERPL W P-5'-P-CCNC: 15 U/L (ref 0–45)
BILIRUB SERPL-MCNC: 0.5 MG/DL (ref 0.2–1.3)
BUN SERPL-MCNC: 15 MG/DL (ref 7–30)
CALCIUM SERPL-MCNC: 9.1 MG/DL (ref 8.5–10.1)
CHLORIDE SERPL-SCNC: 107 MMOL/L (ref 94–109)
CHOLEST SERPL-MCNC: 193 MG/DL
CO2 SERPL-SCNC: 28 MMOL/L (ref 20–32)
CREAT SERPL-MCNC: 0.78 MG/DL (ref 0.52–1.04)
GFR SERPL CREATININE-BSD FRML MDRD: 74 ML/MIN/1.7M2
GLUCOSE SERPL-MCNC: 84 MG/DL (ref 70–99)
HDLC SERPL-MCNC: 55 MG/DL
HIV 1+2 AB+HIV1 P24 AG SERPL QL IA: NONREACTIVE
LDLC SERPL CALC-MCNC: 117 MG/DL
NONHDLC SERPL-MCNC: 138 MG/DL
POTASSIUM SERPL-SCNC: 3.9 MMOL/L (ref 3.4–5.3)
PROT SERPL-MCNC: 7.6 G/DL (ref 6.8–8.8)
SODIUM SERPL-SCNC: 141 MMOL/L (ref 133–144)
TRIGL SERPL-MCNC: 104 MG/DL

## 2018-06-04 ENCOUNTER — ALLIED HEALTH/NURSE VISIT (OUTPATIENT)
Dept: NURSING | Facility: CLINIC | Age: 63
End: 2018-06-04
Payer: COMMERCIAL

## 2018-06-04 DIAGNOSIS — Z23 NEED FOR VACCINATION: Primary | ICD-10-CM

## 2018-06-04 PROCEDURE — 90471 IMMUNIZATION ADMIN: CPT

## 2018-06-04 PROCEDURE — 90472 IMMUNIZATION ADMIN EACH ADD: CPT

## 2018-06-04 PROCEDURE — 90714 TD VACC NO PRESV 7 YRS+ IM: CPT

## 2018-06-04 PROCEDURE — 90670 PCV13 VACCINE IM: CPT

## 2018-06-04 NOTE — MR AVS SNAPSHOT
After Visit Summary   6/4/2018    Amber Black    MRN: [id number]           Patient Information     Date Of Birth          1955        Visit Information        Provider Department      6/4/2018 11:00 AM [facility] - NURSE [facility]        Today's Diagnoses     Need for vaccination    -  1       Follow-ups after your visit        Who to contact     If you have questions or need follow up information about today's clinic visit or your schedule please contact [facility] directly at [phone].  Normal or non-critical lab and imaging results will be communicated to you by [employer], letter or phone within 4 business days after the clinic has received the results. If you do not hear from us within 7 days, please contact the clinic through [employer] or phone. If you have a critical or abnormal lab result, we will notify you by phone as soon as possible.  Submit refill requests through [employer] or call your pharmacy and they will forward the refill request to us. Please allow 3 business days for your refill to be completed.          Additional Information About Your Visit        MyChart Information     [employer] gives you secure access to your electronic health record. If you see a primary care provider, you can also send messages to your care team and make appointments. If you have questions, please call your primary care clinic.  If you do not have a primary care provider, please call [phone] and they will assist you.        Care EveryWhere ID     This is your Care EveryWhere ID. This could be used by other organizations to access your [place] medical records  [id number]         Blood Pressure from Last 3 Encounters:   05/16/18 104/66   01/18/18 100/58   01/17/17 122/60    Weight from Last 3 Encounters:   05/16/18 141 lb 6.4 oz (64.1 kg)   01/18/18 142 lb (64.4 kg)   01/17/17 141 lb 12.8 oz (64.3 kg)              We Performed the Following     C  TD PRSERV FREE >=7 YRS ADS IM     PNEUMOCOCCAL CONJ VACCINE 13 VALENT IM     VACCINE ADMINISTRATION, EACH ADDITIONAL     VACCINE ADMINISTRATION, INITIAL        Primary Care Provider Fax #    Physician No Ref-Primary [phone]       No address on file        Equal Access to Services     MARCELA MELENDEZ : Hadchristopher jose jeffers brian Garcia, wajosé miguelda luqadaha, qaybta kaalmada brannon, romulo bairessusanna nowak. So [facility] [phone].    ATENCIÓN: Si habla español, tiene a sal disposición servicios gratuitos de asistencia lingüística. Llame al [phone].    We comply with applicable federal civil rights laws and [place] laws. We do not discriminate on the basis of race, color, national origin, age, disability, sex, sexual orientation, or gender identity.            Thank you!     Thank you for choosing [facility]  for your care. Our goal is always to provide you with excellent care. Hearing back from our patients is one way we can continue to improve our services. Please take a few minutes to complete the written survey that you may receive in the mail after your visit with us. Thank you!             Your Updated Medication List - Protect others around you: Learn how to safely use, store and throw away your medicines at www.disposemymeds.org.          This list is accurate as of 6/4/18 11:42 AM.  Always use your most recent med list.                   Brand Name Dispense Instructions for use Diagnosis    BENADRYL 25 MG capsule   Generic drug:  diphenhydrAMINE      prn        estradiol 10 MCG Tabs vaginal tablet    VAGIFEM    16 tablet    place 1 tablet vaginally every 3 days    Postmenopausal HRT (hormone replacement therapy), Female dyspareunia       OXYTROL 3.9 MG/24HR BIW patch   Generic drug:  oxybutynin     24 patch    PLACE 1 PATCH ONTO THE SKIN TWICE A WEEK (EVERY 4 DAYS)    Bladder spasms       TYLENOL EXTRA STRENGTH PO      Take 1 tablet by mouth as needed 2 bid

## 2018-06-04 NOTE — NURSING NOTE
Screening Questionnaire for Adult Immunization    Are you sick today?   No   Do you have allergies to medications, food, a vaccine component or latex?   Yes   Have you ever had a serious reaction after receiving a vaccination?   No   Do you have a long-term health problem with heart disease, lung disease, asthma, kidney disease, metabolic disease (e.g. diabetes), anemia, or other blood disorder?   No   Do you have cancer, leukemia, HIV/AIDS, or any other immune system problem?   No   In the past 3 months, have you taken medications that affect  your immune system, such as prednisone, other steroids, or anticancer drugs; drugs for the treatment of rheumatoid arthritis, Crohn s disease, or psoriasis; or have you had radiation treatments?   No   Have you had a seizure, or a brain or other nervous system problem?   No   During the past year, have you received a transfusion of blood or blood     products, or been given immune (gamma) globulin or antiviral drug?   No   For women: Are you pregnant or is there a chance you could become        pregnant during the next month?   No   Have you received any vaccinations in the past 4 weeks?   No     Immunization questionnaire was positive for at least one answer.  Notified Felisha Merchant.        Per orders of Dr. Felisha Merchant, injection of Td and Prevnar given by Surekha Ball. Patient instructed to remain in clinic for 15 minutes afterwards, and to report any adverse reaction to me immediately.       Screening performed by Surekha Ball on 6/4/2018 at 11:37 AM.

## 2018-06-19 DIAGNOSIS — Z79.890 POSTMENOPAUSAL HRT (HORMONE REPLACEMENT THERAPY): ICD-10-CM

## 2018-06-19 DIAGNOSIS — N94.10 FEMALE DYSPAREUNIA: ICD-10-CM

## 2018-06-19 RX ORDER — ESTRADIOL 10 UG/1
TABLET VAGINAL
Qty: 16 TABLET | Refills: 0 | Status: SHIPPED | OUTPATIENT
Start: 2018-06-19 | End: 2018-11-10

## 2018-06-19 NOTE — TELEPHONE ENCOUNTER
Prescription approved per [facility] Refill Protocol.  IMANI Angela RN

## 2018-09-11 ENCOUNTER — ALLIED HEALTH/NURSE VISIT (OUTPATIENT)
Dept: NURSING | Facility: CLINIC | Age: 63
End: 2018-09-11
Payer: COMMERCIAL

## 2018-09-11 DIAGNOSIS — Z23 NEED FOR PROPHYLACTIC VACCINATION AND INOCULATION AGAINST INFLUENZA: Primary | ICD-10-CM

## 2018-09-11 DIAGNOSIS — Z23 NEED FOR VACCINATION: ICD-10-CM

## 2018-09-11 PROCEDURE — 90472 IMMUNIZATION ADMIN EACH ADD: CPT

## 2018-09-11 PROCEDURE — 90471 IMMUNIZATION ADMIN: CPT

## 2018-09-11 PROCEDURE — 90750 HZV VACC RECOMBINANT IM: CPT

## 2018-09-11 PROCEDURE — 90662 IIV NO PRSV INCREASED AG IM: CPT

## 2018-09-11 NOTE — MR AVS SNAPSHOT
After Visit Summary   9/11/2018    Amber Black    MRN: [id number]           Patient Information     Date Of Birth          1955        Visit Information        Provider Department      9/11/2018 3:30 PM [facility] - NURSE [facility]        Today's Diagnoses     Need for prophylactic vaccination and inoculation against influenza    -  1    Need for vaccination           Follow-ups after your visit        Who to contact     If you have questions or need follow up information about today's clinic visit or your schedule please contact [facility] directly at [phone].  Normal or non-critical lab and imaging results will be communicated to you by [employer], letter or phone within 4 business days after the clinic has received the results. If you do not hear from us within 7 days, please contact the clinic through [employer] or phone. If you have a critical or abnormal lab result, we will notify you by phone as soon as possible.  Submit refill requests through [employer] or call your pharmacy and they will forward the refill request to us. Please allow 3 business days for your refill to be completed.          Additional Information About Your Visit        MyChart Information     [employer] gives you secure access to your electronic health record. If you see a primary care provider, you can also send messages to your care team and make appointments. If you have questions, please call your primary care clinic.  If you do not have a primary care provider, please call [phone] and they will assist you.        Care EveryWhere ID     This is your Care EveryWhere ID. This could be used by other organizations to access your [facility] medical records  [id number]         Blood Pressure from Last 3 Encounters:   05/16/18 104/66   01/18/18 100/58   01/17/17 122/60    Weight from Last 3 Encounters:   05/16/18 141 lb 6.4 oz (64.1 kg)   01/18/18 142 lb (64.4 kg)    01/17/17 141 lb 12.8 oz (64.3 kg)              We Performed the Following     FLU VACCINE, INCREASED ANTIGEN, PRESV FREE, AGE 65+ [92660]     HC ZOSTER VACCINE RECOMBINANT ADJUVANTED IM NJX     Vaccine Administration, Each Additional [48604]     Vaccine Administration, Initial [41133]        Primary Care Provider Fax #    Physician No Ref-Primary [phone]       No address on file        Equal Access to Services     TERESITA MELENDEZ : Hadii aad ku hadasho Soomaali, waaxda luqadaha, qaybta kaalmada adeegyada, waxay idiin hayaan ademisbah ryan lasethdante . So [facility] [phone].    ATENCIÓN: Si habla espdaly, tiene a sal disposición servicios gratuitos de asistencia lingüística. Trungame al [phone].    We comply with applicable federal civil rights laws and [place] laws. We do not discriminate on the basis of race, color, national origin, age, disability, sex, sexual orientation, or gender identity.            Thank you!     Thank you for choosing [facility]  for your care. Our goal is always to provide you with excellent care. Hearing back from our patients is one way we can continue to improve our services. Please take a few minutes to complete the written survey that you may receive in the mail after your visit with us. Thank you!             Your Updated Medication List - Protect others around you: Learn how to safely use, store and throw away your medicines at www.disposemymeds.org.          This list is accurate as of 9/11/18  3:55 PM.  Always use your most recent med list.                   Brand Name Dispense Instructions for use Diagnosis    BENADRYL 25 MG capsule   Generic drug:  diphenhydrAMINE      prn        OXYTROL 3.9 MG/24HR BIW patch   Generic drug:  oxybutynin     24 patch    PLACE 1 PATCH ONTO THE SKIN TWICE A WEEK (EVERY 4 DAYS)    Bladder spasms       TYLENOL EXTRA STRENGTH PO      Take 1 tablet by mouth as needed 2 bid        YUVAFEM 10 MCG Tabs vaginal tablet   Generic  drug:  estradiol     16 tablet    place 1 tablet vaginally every 3 days    Postmenopausal HRT (hormone replacement therapy), Female dyspareunia

## 2018-09-11 NOTE — NURSING NOTE
Screening Questionnaire for Adult Immunization    Are you sick today?   No   Do you have allergies to medications, food, a vaccine component or latex?   Yes   Have you ever had a serious reaction after receiving a vaccination?   No   Do you have a long-term health problem with heart disease, lung disease, asthma, kidney disease, metabolic disease (e.g. diabetes), anemia, or other blood disorder?   No   Do you have cancer, leukemia, HIV/AIDS, or any other immune system problem?   No   In the past 3 months, have you taken medications that affect  your immune system, such as prednisone, other steroids, or anticancer drugs; drugs for the treatment of rheumatoid arthritis, Crohn s disease, or psoriasis; or have you had radiation treatments?   No   Have you had a seizure, or a brain or other nervous system problem?   No   During the past year, have you received a transfusion of blood or blood     products, or been given immune (gamma) globulin or antiviral drug?   No   For women: Are you pregnant or is there a chance you could become        pregnant during the next month?   No   Have you received any vaccinations in the past 4 weeks?   No     Immunization questionnaire was positive for at least one answer.  Notified Dr. John.        Per orders of Dr. John, injection of Flu shot and Shingrix given by Surekha Ball. Patient instructed to remain in clinic for 15 minutes afterwards, and to report any adverse reaction to me immediately.       Screening performed by Surekha Ball on 9/11/2018 at 3:42 PM.

## 2018-09-26 ENCOUNTER — TELEPHONE (OUTPATIENT)
Dept: OBGYN | Facility: CLINIC | Age: 63
End: 2018-09-26

## 2018-09-26 NOTE — TELEPHONE ENCOUNTER
PRIOR AUTHORIZATION DENIED    Medication: Oxytrol 3.9mg/24H biweekly patches     Denial Date: 9/26/2018    Denial Rational:      Appeal Information:    If you would like to appeal, please supply P/A team with a letter of medical necessity with clinical reason.

## 2018-09-26 NOTE — TELEPHONE ENCOUNTER
Central Prior Authorization Team   Phone: [phone]      PA Initiation    Medication: Oxytrol 3.9mg/24H biweekly patches   Insurance Company: [employer] - Phone [phone] Fax [phone]  Pharmacy Filling the Rx: [id number] - [place], [place] - [address]  Filling Pharmacy Phone: [phone]  Filling Pharmacy Fax:    Start Date: 9/26/2018

## 2018-09-26 NOTE — TELEPHONE ENCOUNTER
To inform you of the rejection issue since you wrote the rx. I sent to PA pool to assist and let us know what our options will be.      Marcia Peck RN

## 2018-09-26 NOTE — TELEPHONE ENCOUNTER
Fax received from [facility] stating a claim for Oxytrol 3.9mg/24H biweekly patches was rejected for reimbursement. To assist resolving the rejection go to covermymeds.com:    Key: [id number]  Patient's last name: Sofia  : 1955    Then:  -Review options to resolve the rejection  -Send a new rx or attempt CHRIS Peck RN

## 2018-09-27 NOTE — TELEPHONE ENCOUNTER
Per PA team:  This medication was denied. If the provider would like to appeal please provide a letter of medical necessity and route back to the team. Otherwise you can close the encounter.   Thank you,   Central PA Team     Fe Jules RN

## 2018-09-27 NOTE — TELEPHONE ENCOUNTER
Called patient. She found OTC version and has been taking that, and it has been working well for her. Called the pharmacy to take the rx off her auto-request list so we will no longer get these requests.        Marcia Peck RN

## 2018-09-27 NOTE — TELEPHONE ENCOUNTER
Please let the patient know. I did not prescribe this initially, but refilled it for her. I do not know if she has tried the over the counter oral medication. If she has and failed, she would need to let us know what happened (didn't tolerate it due to side effects or it simply didn't work). If she hasn't tried it, I would recommend that she stop the patch and try it first. If it doesn't work, we can then resubmit the appeal.    Tell her I'm sorry, but we don't have any control over what the insurance approves.    Lolis Solano MD

## 2018-11-10 DIAGNOSIS — N94.10 FEMALE DYSPAREUNIA: ICD-10-CM

## 2018-11-10 DIAGNOSIS — Z79.890 POSTMENOPAUSAL HRT (HORMONE REPLACEMENT THERAPY): ICD-10-CM

## 2018-11-12 RX ORDER — ESTRADIOL 10 UG/1
TABLET VAGINAL
Qty: 16 TABLET | Refills: 0 | Status: SHIPPED | OUTPATIENT
Start: 2018-11-12 | End: 2018-12-14

## 2018-12-07 ENCOUNTER — HOSPITAL ENCOUNTER (OUTPATIENT)
Dept: MAMMOGRAPHY | Facility: CLINIC | Age: 63
Discharge: HOME | End: 2018-12-07
Attending: OBSTETRICS & GYNECOLOGY | Admitting: OBSTETRICS & GYNECOLOGY
Payer: COMMERCIAL

## 2018-12-07 DIAGNOSIS — Z12.31 VISIT FOR SCREENING MAMMOGRAM: ICD-10-CM

## 2018-12-07 PROCEDURE — 77067 SCR MAMMO BI INCL CAD: CPT

## 2019-01-24 ENCOUNTER — OFFICE VISIT (OUTPATIENT)
Dept: OBGYN | Facility: CLINIC | Age: 64
End: 2019-01-24
Payer: COMMERCIAL

## 2019-01-24 VITALS — WEIGHT: 145 LBS | SYSTOLIC BLOOD PRESSURE: 102 MMHG | BODY MASS INDEX: 23.4 KG/M2 | DIASTOLIC BLOOD PRESSURE: 60 MMHG

## 2019-01-24 DIAGNOSIS — N94.10 FEMALE DYSPAREUNIA: Primary | ICD-10-CM

## 2019-01-24 DIAGNOSIS — Z01.419 ENCOUNTER FOR GYNECOLOGICAL EXAMINATION WITHOUT ABNORMAL FINDING: ICD-10-CM

## 2019-01-24 DIAGNOSIS — N95.2 ATROPHIC VAGINITIS: ICD-10-CM

## 2019-01-24 PROCEDURE — 99397 PER PM REEVAL EST PAT 65+ YR: CPT | Performed by: OBSTETRICS & GYNECOLOGY

## 2019-01-24 NOTE — NURSING NOTE
"Chief Complaint   Patient presents with     Physical     Annual exam. Pap and mammo current.       Initial /60   Wt 65.8 kg (145 lb)   Breastfeeding? No   BMI 23.40 kg/m   Estimated body mass index is 23.4 kg/m  as calculated from the following:    Height as of 18: 1.676 m (5' 6\").    Weight as of this encounter: 65.8 kg (145 lb).  BP completed using cuff size: regular    Questioned patient about current smoking habits.  Pt. quit smoking some time ago.          The following HM Due: NONE      The following patient reported/Care Every where data was sent to:  P ABSTRACT QUALITY INITIATIVES [89535]  Lydia Mcqueen LPN               "

## 2019-02-15 DIAGNOSIS — N94.10 FEMALE DYSPAREUNIA: ICD-10-CM

## 2019-02-15 DIAGNOSIS — Z79.890 POSTMENOPAUSAL HRT (HORMONE REPLACEMENT THERAPY): ICD-10-CM

## 2019-02-15 RX ORDER — ESTRADIOL 10 UG/1
TABLET VAGINAL
Qty: 16 TABLET | Refills: 3 | Status: SHIPPED | OUTPATIENT
Start: 2019-02-15 | End: 2019-09-21

## 2019-02-15 NOTE — TELEPHONE ENCOUNTER
"Requested Prescriptions   Pending Prescriptions Disp Refills     YUVAFEM 10 MCG TABS vaginal tablet [Pharmacy Med Name: Yuvafem Vaginal Tablet 10 MCG] 16 tablet 0     Sig: place 1 tablet vaginally every 3 days    Hormone Replacement Therapy Passed - 2/15/2019  2:42 PM       Passed - Blood pressure under 140/90 in past 12 months    BP Readings from Last 3 Encounters:   01/24/19 102/60   05/16/18 104/66   01/18/18 100/58                Passed - Recent (12 mo) or future (30 days) visit within the authorizing provider's specialty    Patient had office visit in the last 12 months or has a visit in the next 30 days with authorizing provider or within the authorizing provider's specialty.  See \"Patient Info\" tab in inbasket, or \"Choose Columns\" in Meds & Orders section of the refill encounter.             Passed - Patient has mammogram in past 2 years on file if age 50-75       Passed - Medication is active on med list       Passed - Patient is 18 years of age or older       Passed - No active pregnancy on record       Passed - No positive pregnancy test on record in past 12 months        rx approved per [facility] protocol.      Marcia Peck RN    "

## 2019-05-14 ENCOUNTER — MYC MEDICAL ADVICE (OUTPATIENT)
Dept: INTERNAL MEDICINE | Facility: CLINIC | Age: 64
End: 2019-05-14

## 2019-06-07 ENCOUNTER — OFFICE VISIT (OUTPATIENT)
Dept: INTERNAL MEDICINE | Facility: CLINIC | Age: 64
End: 2019-06-07
Payer: COMMERCIAL

## 2019-06-07 VITALS
OXYGEN SATURATION: 98 % | BODY MASS INDEX: 22.63 KG/M2 | TEMPERATURE: 98.4 F | HEIGHT: 66 IN | RESPIRATION RATE: 16 BRPM | DIASTOLIC BLOOD PRESSURE: 64 MMHG | SYSTOLIC BLOOD PRESSURE: 102 MMHG | WEIGHT: 140.8 LBS | HEART RATE: 75 BPM

## 2019-06-07 DIAGNOSIS — R79.89 ABNORMAL TSH: ICD-10-CM

## 2019-06-07 DIAGNOSIS — Z13.1 SCREENING FOR DIABETES MELLITUS: ICD-10-CM

## 2019-06-07 DIAGNOSIS — Z13.220 LIPID SCREENING: ICD-10-CM

## 2019-06-07 DIAGNOSIS — Z23 NEED FOR VACCINATION: Primary | ICD-10-CM

## 2019-06-07 LAB
CHOLEST SERPL-MCNC: 197 MG/DL
GLUCOSE SERPL-MCNC: 85 MG/DL (ref 70–99)
HDLC SERPL-MCNC: 52 MG/DL
LDLC SERPL CALC-MCNC: 125 MG/DL
NONHDLC SERPL-MCNC: 145 MG/DL
TRIGL SERPL-MCNC: 100 MG/DL
TSH SERPL DL<=0.005 MIU/L-ACNC: 1.95 MU/L (ref 0.4–4)

## 2019-06-07 PROCEDURE — 90732 PPSV23 VACC 2 YRS+ SUBQ/IM: CPT | Performed by: PHYSICIAN ASSISTANT

## 2019-06-07 PROCEDURE — 82947 ASSAY GLUCOSE BLOOD QUANT: CPT | Performed by: PHYSICIAN ASSISTANT

## 2019-06-07 PROCEDURE — 99213 OFFICE O/P EST LOW 20 MIN: CPT | Mod: 25 | Performed by: PHYSICIAN ASSISTANT

## 2019-06-07 PROCEDURE — 36415 COLL VENOUS BLD VENIPUNCTURE: CPT | Performed by: PHYSICIAN ASSISTANT

## 2019-06-07 PROCEDURE — 90471 IMMUNIZATION ADMIN: CPT | Performed by: PHYSICIAN ASSISTANT

## 2019-06-07 PROCEDURE — 84443 ASSAY THYROID STIM HORMONE: CPT | Performed by: PHYSICIAN ASSISTANT

## 2019-06-07 PROCEDURE — 90472 IMMUNIZATION ADMIN EACH ADD: CPT | Performed by: PHYSICIAN ASSISTANT

## 2019-06-07 PROCEDURE — 80061 LIPID PANEL: CPT | Performed by: PHYSICIAN ASSISTANT

## 2019-06-07 PROCEDURE — 90750 HZV VACC RECOMBINANT IM: CPT | Performed by: PHYSICIAN ASSISTANT

## 2019-06-07 ASSESSMENT — MIFFLIN-ST. JEOR: SCORE: 1195.41

## 2019-06-07 NOTE — PROGRESS NOTES
"Subjective     Amber Black is a 64 year old female who presents to clinic today for the following health issues:    HPI     Patient is here today for pneumonia and shingles vaccines.     Patient is also requesting labs for TSH as she has had an abnormal TSH in past which she forgot to follow up on.    She is also due for diabetes and cholesterol screenings.       Reviewed and updated as needed this visit by Provider  Meds  Problems             Objective    /64   Pulse 75   Temp 98.4  F (36.9  C) (Oral)   Resp 16   Ht 1.676 m (5' 6\")   Wt 63.9 kg (140 lb 12.8 oz)   SpO2 98%   BMI 22.73 kg/m    Body mass index is 22.73 kg/m .  Physical Exam   GENERAL: healthy, alert and no distress  RESP: lungs clear to auscultation - no rales, rhonchi or wheezes  CV: regular rates and rhythm, normal S1 S2, no S3 or S4 and no murmur, click or rub    Diagnostic Test Results:  Labs reviewed in Epic        Assessment & Plan     1. Need for vaccination  - Pneumococcal vaccine 23 valent PPSV23  (Pneumovax) [16100]  - SHINGRIX [58177]  - 1st  Administration  [76287]  - Each additional admin.  (Right click and add QUANTITY)  [06701]    2. Lipid screening  - Lipid panel reflex to direct LDL Fasting    3. Screening for diabetes mellitus  - Glucose    4. Abnormal TSH  - TSH with free T4 reflex       Patient briefly noted back pain following gardening and that she was planning on waiting to be seen for this. She notes it is more sore than it typically is. She has no numbness, tingling and weakness. Brief back exam was within normal limits. Patient plans to monitor and may consider PT if no improvement. Certainly if not improving over 6 weeks would recommend office visit with full review and possible imaging.       Felisha Kurtz PA-C  [facility]      "

## 2019-06-07 NOTE — PROCEDURES
Screening Questionnaire for Adult Immunization    Are you sick today?   No   Do you have allergies to medications, food, a vaccine component or latex?   No   Have you ever had a serious reaction after receiving a vaccination?   No   Do you have a long-term health problem with heart disease, lung disease, asthma, kidney disease, metabolic disease (e.g. diabetes), anemia, or other blood disorder?   No   Do you have cancer, leukemia, HIV/AIDS, or any other immune system problem?   No   In the past 3 months, have you taken medications that affect  your immune system, such as prednisone, other steroids, or anticancer drugs; drugs for the treatment of rheumatoid arthritis, Crohn s disease, or psoriasis; or have you had radiation treatments?   No   Have you had a seizure, or a brain or other nervous system problem?   No   During the past year, have you received a transfusion of blood or blood     products, or been given immune (gamma) globulin or antiviral drug?   No   For women: Are you pregnant or is there a chance you could become        pregnant during the next month?   No   Have you received any vaccinations in the past 4 weeks?   No     Immunization questionnaire answers were all negative.        Per orders of Felisha Washington PA-C injection of Shingrix #1 and [id number] given by Barbie Wooten. Patient instructed to remain in clinic for 15 minutes afterwards, and to report any adverse reaction to me immediately.       Screening performed by Barbie Wooten on 6/7/2019 at 10:58 AM.

## 2019-06-16 ENCOUNTER — MYC MEDICAL ADVICE (OUTPATIENT)
Dept: INTERNAL MEDICINE | Facility: CLINIC | Age: 64
End: 2019-06-16

## 2019-06-16 DIAGNOSIS — S39.012A BACK STRAIN, INITIAL ENCOUNTER: Primary | ICD-10-CM

## 2019-06-17 ENCOUNTER — MYC MEDICAL ADVICE (OUTPATIENT)
Dept: INTERNAL MEDICINE | Facility: CLINIC | Age: 64
End: 2019-06-17

## 2019-06-17 RX ORDER — METHOCARBAMOL 750 MG/1
750 TABLET, FILM COATED ORAL 3 TIMES DAILY
Qty: 30 TABLET | Refills: 0 | Status: SHIPPED | OUTPATIENT
Start: 2019-06-17 | End: 2019-10-03

## 2019-06-17 NOTE — TELEPHONE ENCOUNTER
Pt called about getting a muscle relaxer.  See [employer] message below.  She is leaving town tomorrow 6/18/19.  Please call pt asap.  [facility]

## 2019-09-21 DIAGNOSIS — Z79.890 POSTMENOPAUSAL HRT (HORMONE REPLACEMENT THERAPY): ICD-10-CM

## 2019-09-21 DIAGNOSIS — N94.10 FEMALE DYSPAREUNIA: ICD-10-CM

## 2019-09-23 RX ORDER — ESTRADIOL 10 UG/1
INSERT VAGINAL
Qty: 16 TABLET | Refills: 2 | Status: SHIPPED | OUTPATIENT
Start: 2019-09-23 | End: 2020-01-28

## 2019-09-23 NOTE — TELEPHONE ENCOUNTER
Prescription approved per [facility] Protocol.  Meena PHIPPS RN

## 2019-09-29 ENCOUNTER — HEALTH MAINTENANCE LETTER (OUTPATIENT)
Age: 64
End: 2019-09-29

## 2019-09-30 ASSESSMENT — ACTIVITIES OF DAILY LIVING (ADL): CURRENT_FUNCTION: NO ASSISTANCE NEEDED

## 2019-10-03 ENCOUNTER — OFFICE VISIT (OUTPATIENT)
Dept: INTERNAL MEDICINE | Facility: CLINIC | Age: 64
End: 2019-10-03
Payer: COMMERCIAL

## 2019-10-03 VITALS
DIASTOLIC BLOOD PRESSURE: 70 MMHG | RESPIRATION RATE: 16 BRPM | HEIGHT: 66 IN | HEART RATE: 68 BPM | OXYGEN SATURATION: 99 % | TEMPERATURE: 98.2 F | WEIGHT: 141 LBS | SYSTOLIC BLOOD PRESSURE: 122 MMHG | BODY MASS INDEX: 22.66 KG/M2

## 2019-10-03 DIAGNOSIS — Z00.00 ROUTINE HISTORY AND PHYSICAL EXAMINATION OF ADULT: Primary | ICD-10-CM

## 2019-10-03 DIAGNOSIS — Z23 NEED FOR PROPHYLACTIC VACCINATION AND INOCULATION AGAINST INFLUENZA: ICD-10-CM

## 2019-10-03 DIAGNOSIS — Z13.220 LIPID SCREENING: ICD-10-CM

## 2019-10-03 LAB
CHOLEST SERPL-MCNC: 190 MG/DL
HDLC SERPL-MCNC: 55 MG/DL
LDLC SERPL CALC-MCNC: 109 MG/DL
NONHDLC SERPL-MCNC: 135 MG/DL
TRIGL SERPL-MCNC: 128 MG/DL

## 2019-10-03 PROCEDURE — 36415 COLL VENOUS BLD VENIPUNCTURE: CPT | Performed by: PHYSICIAN ASSISTANT

## 2019-10-03 PROCEDURE — G0008 ADMIN INFLUENZA VIRUS VAC: HCPCS | Performed by: PHYSICIAN ASSISTANT

## 2019-10-03 PROCEDURE — 80061 LIPID PANEL: CPT | Performed by: PHYSICIAN ASSISTANT

## 2019-10-03 PROCEDURE — 90662 IIV NO PRSV INCREASED AG IM: CPT | Performed by: PHYSICIAN ASSISTANT

## 2019-10-03 PROCEDURE — 99397 PER PM REEVAL EST PAT 65+ YR: CPT | Mod: 25 | Performed by: PHYSICIAN ASSISTANT

## 2019-10-03 ASSESSMENT — MIFFLIN-ST. JEOR: SCORE: 1196.32

## 2019-10-03 ASSESSMENT — ACTIVITIES OF DAILY LIVING (ADL): CURRENT_FUNCTION: NO ASSISTANCE NEEDED

## 2019-10-03 NOTE — PROGRESS NOTES
"SUBJECTIVE:   Amber Black is a 64 year old female who presents for Preventive Visit.  Are you in the first 12 months of your Medicare coverage?  No    Healthy Habits:     In general, how would you rate your overall health?  Excellent    Frequency of exercise:  2-3 days/week    Duration of exercise:  30-45 minutes    Do you usually eat at least 4 servings of fruit and vegetables a day, include whole grains    & fiber and avoid regularly eating high fat or \"junk\" foods?  Yes    Taking medications regularly:  Yes    Medication side effects:  Not applicable    Ability to successfully perform activities of daily living:  No assistance needed    Home Safety:  No safety concerns identified    Hearing Impairment:  No hearing concerns    In the past 6 months, have you been bothered by leaking of urine?  No    In general, how would you rate your overall mental or emotional health?  Excellent      PHQ-2 Total Score: 0    Additional concerns today:  No    Do you feel safe in your environment? Yes    Do you have a Health Care Directive? Yes: Patient states has Advance Directive and will bring in a copy to clinic.      Fall risk  Fallen 2 or more times in the past year?: No  Any fall with injury in the past year?: No    Cognitive Screening   1) Repeat 3 items (Leader, Season, Table)    2) Clock draw: NORMAL  3) 3 item recall: Recalls 3 objects   Results: NORMAL clock, 3 items recalled: COGNITIVE IMPAIRMENT LESS LIKELY    Mini-CogTM Copyright EITAN Guy. Licensed by the author for use in [facility]; reprinted with permission ([email or web address]@.[email or web address]). All rights reserved.      Do you have sleep apnea, excessive snoring or daytime drowsiness?: no    Reviewed and updated as needed this visit by clinical staff  Tobacco  Allergies  Meds  Problems  Surg Hx  Fam Hx  Soc Hx        Reviewed and updated as needed this visit by Provider  Tobacco  Meds  Problems  Surg Hx  Fam Hx  Soc Hx       Social History     Tobacco Use     " "Smoking status: Former Smoker     Smokeless tobacco: Never Used     Tobacco comment: quit 1980   Substance Use Topics     Alcohol use: No     If you drink alcohol do you typically have >3 drinks per day or >7 drinks per week? No    Alcohol Use 10/3/2019   Prescreen: >3 drinks/day or >7 drinks/week? -   Prescreen: >3 drinks/day or >7 drinks/week? No             Current providers sharing in care for this patient include:   Patient Care Team:  Felisha Lindo PA-C as PCP - General (Physician Assistant)  Felisha Lindo PA-C as Assigned PCP    The following health maintenance items are reviewed in Epic and correct as of today:  Health Maintenance   Topic Date Due     ADVANCE CARE PLANNING  04/10/2018     FALL RISK ASSESSMENT  06/01/2018     BMP  05/16/2019     INFLUENZA VACCINE (1) 09/01/2019     MAMMO SCREENING  12/07/2019     MEDICARE ANNUAL WELLNESS VISIT  01/24/2020     DEXA  04/27/2020     LIPID  06/07/2020     COLONOSCOPY  06/24/2025     DTAP/TDAP/TD IMMUNIZATION (3 - Td) 06/04/2028     PHQ-2  Completed     PNEUMOCOCCAL IMMUNIZATION 65+ LOW/MEDIUM RISK  Completed     ZOSTER IMMUNIZATION  Completed     HEPATITIS C SCREENING  Addressed     IPV IMMUNIZATION  Aged Out     MENINGITIS IMMUNIZATION  Aged Out         Review of Systems  Constitutional, HEENT, cardiovascular, pulmonary, gi and gu systems are negative, except as otherwise noted.    OBJECTIVE:   /70   Pulse 68   Temp 98.2  F (36.8  C) (Temporal)   Resp 16   Ht 1.676 m (5' 6\")   Wt 64 kg (141 lb)   SpO2 99%   BMI 22.76 kg/m   Estimated body mass index is 22.76 kg/m  as calculated from the following:    Height as of this encounter: 1.676 m (5' 6\").    Weight as of this encounter: 64 kg (141 lb).  Physical Exam  GENERAL: healthy, alert and no distress  EYES: Eyes grossly normal to inspection, PERRL and conjunctivae and sclerae normal  HENT: ear canals and TM's normal, nose and mouth without ulcers or lesions  NECK: no adenopathy, " no asymmetry, masses, or scars and thyroid normal to palpation  RESP: lungs clear to auscultation - no rales, rhonchi or wheezes  CV: regular rate and rhythm, normal S1 S2, no S3 or S4, no murmur, click or rub, no peripheral edema and peripheral pulses strong  ABDOMEN: soft, nontender, no hepatosplenomegaly, no masses and bowel sounds normal  MS: no gross musculoskeletal defects noted, no edema  SKIN: no suspicious lesions or rashes  NEURO: Normal strength and tone, mentation intact and speech normal  PSYCH: mentation appears normal, affect normal/bright    Diagnostic Test Results:  Labs reviewed in Epic    ASSESSMENT / PLAN:     1. Routine history and physical examination of adult  - reviewed PMH and health maintenance     2. Lipid screening  - Lipid panel reflex to direct LDL Fasting    3. Need for prophylactic vaccination and inoculation against influenza  - INFLUENZA (HIGH DOSE) 3 VALENT VACCINE [49086]  - ADMIN INFLUENZA (For MEDICARE Patients ONLY) []    Felisha Kurtz PA-C  [facility]

## 2019-12-09 ENCOUNTER — HOSPITAL ENCOUNTER (OUTPATIENT)
Dept: MAMMOGRAPHY | Facility: CLINIC | Age: 64
Discharge: HOME | End: 2019-12-09
Attending: OBSTETRICS & GYNECOLOGY | Admitting: OBSTETRICS & GYNECOLOGY
Payer: COMMERCIAL

## 2019-12-09 DIAGNOSIS — Z12.31 OTHER SCREENING MAMMOGRAM: ICD-10-CM

## 2019-12-09 PROCEDURE — 77063 BREAST TOMOSYNTHESIS BI: CPT

## 2020-01-28 ENCOUNTER — OFFICE VISIT (OUTPATIENT)
Dept: OBGYN | Facility: CLINIC | Age: 65
End: 2020-01-28
Payer: COMMERCIAL

## 2020-01-28 VITALS — DIASTOLIC BLOOD PRESSURE: 66 MMHG | WEIGHT: 138.2 LBS | BODY MASS INDEX: 22.31 KG/M2 | SYSTOLIC BLOOD PRESSURE: 116 MMHG

## 2020-01-28 DIAGNOSIS — Z01.419 ENCOUNTER FOR ANNUAL ROUTINE GYNECOLOGICAL EXAMINATION: Primary | ICD-10-CM

## 2020-01-28 DIAGNOSIS — N94.10 FEMALE DYSPAREUNIA: ICD-10-CM

## 2020-01-28 PROCEDURE — 99397 PER PM REEVAL EST PAT 65+ YR: CPT | Performed by: OBSTETRICS & GYNECOLOGY

## 2020-01-28 RX ORDER — ESTRADIOL 10 UG/1
INSERT VAGINAL
Qty: 16 TABLET | Refills: 3 | Status: SHIPPED | OUTPATIENT
Start: 2020-01-28 | End: 2020-08-12

## 2020-01-28 NOTE — PROGRESS NOTES
Amber is a 64 year old  who presents for annual exam.   Postmenopausal.  She is having vaginal dryness. No vaginal bleeding noted. Dryness is overall improved with vagifem. At times, notices some dryness which is almost like an itch, just an irritation. Intermittent. She is not having penetrative vaginal intercourse at this time and states this is her preference, and that she will let me know if this changes.    Besides routine health maintenance, she has no other health concerns today .  GYNECOLOGIC HISTORY:  She is sexually active with 1 male partner(s) and she is currently in monogamous relationship.    History sexually transmitted infections:No STD history  Estrogen replacement therapy: Yes -  Vaginal only     History of abnormal Pap smear: NO - age 30-65 PAP every 5 years with negative HPV co-testing recommended  Family history of breast CA: No  Family history of uterine/ovarian CA: No  Family history of colon CA: No    HEALTH MAINTENANCE:  Reviewed.    HISTORY:  OB History    Para Term  AB Living   1 0 0 0 1 0   SAB TAB Ectopic Multiple Live Births   0 1 0 0 0      # Outcome Date GA Lbr Jaylon/2nd Weight Sex Delivery Anes PTL Lv   1 TAB      TAB   DEC     Past Medical History:   Diagnosis Date     Irritable bowel syndrome     stable     Past Surgical History:   Procedure Laterality Date     C NONSPECIFIC PROCEDURE      septoplasty and polypectomy     C NONSPECIFIC PROCEDURE      wisdom teeth     TUBAL LIGATION  age 28     Family History   Problem Relation Age of Onset     Diabetes Mother         B:192     Hypertension Mother      Musculoskeletal Disorder Mother         spine problems     Alzheimer Disease Mother 84     Family History Negative Father         B:192     Psychotic Disorder Brother         bi-polar, alcholism,  @ age 70     Coronary Artery Disease Brother      C.A.D. Brother         Aortic aneurysm     Lung Cancer Brother         Stage 4 -  2018     Social History      Socioeconomic History     Marital status:      Spouse name: Michael     Number of children: 0     Years of education: None     Highest education level: None   Occupational History     Employer: NONE    Social Needs     Financial resource strain: None     Food insecurity:     Worry: None     Inability: None     Transportation needs:     Medical: None     Non-medical: None   Tobacco Use     Smoking status: Former Smoker     Smokeless tobacco: Never Used     Tobacco comment: quit 1980   Substance and Sexual Activity     Alcohol use: No     Drug use: No     Sexual activity: Yes     Partners: Male   Lifestyle     Physical activity:     Days per week: None     Minutes per session: None     Stress: None   Relationships     Social connections:     Talks on phone: None     Gets together: None     Attends [employer] service: None     Active member of club or organization: None     Attends meetings of clubs or organizations: None     Relationship status: None     Intimate partner violence:     Fear of current or ex partner: None     Emotionally abused: None     Physically abused: None     Forced sexual activity: None   Other Topics Concern      Service No     Blood Transfusions No     Caffeine Concern No     Occupational Exposure No     Hobby Hazards No     Sleep Concern No     Stress Concern No     Weight Concern No     Special Diet Yes     Comment: weight watchers     Back Care No     Exercise Yes     Comment: 4-5 days per week     Bike Helmet Yes     Seat Belt Yes     Self-Exams Yes   Social History Narrative    Living arrangements - the patient lives with her spouse.        Current Outpatient Medications:      Acetaminophen (TYLENOL EXTRA STRENGTH PO), Take 1 tablet by mouth as needed 2 bid, Disp: , Rfl:      estradiol (VAGIFEM) 10 MCG TABS vaginal tablet, place 1 tablet vaginally every 3 days, Disp: 16 tablet, Rfl: 2     OVER-THE-COUNTER, FLONASE, Disp: , Rfl:      OXYTROL 3.9 MG/24HR BIW patch, PLACE 1  PATCH ONTO THE SKIN TWICE A WEEK (EVERY 4 DAYS), Disp: 24 patch, Rfl: 8  Allergies   Allergen Reactions     Levaquin Hives     Levaquin induced fixed drug eruption.  Biopsy proven by dermatologist.       Cyclobenzaprine Hcl      drowsiness     Seafood Nausea and Vomiting       Past medical, surgical, social and family history were reviewed and updated in EPIC.    ROS:  12 point review of systems negative other than symptoms noted below.      EXAM:  /66 (BP Location: Right arm, Patient Position: Chair, Cuff Size: Adult Regular)   Wt 62.7 kg (138 lb 3.2 oz)   BMI 22.31 kg/m     BMI: Body mass index is 22.31 kg/m .  Constitutional: healthy, alert and no distress  Head: Normocephalic. No masses, lesions, tenderness or abnormalities  Neck: Neck supple. Trachea midline. No adenopathy. Thyroid symmetric, normal size.   Cardiovascular: RRR.   Respiratory: Negative.   Breast: No nodularity, asymmetry or nipple discharge bilaterally.  Gastrointestinal: Abdomen soft, non-tender, non-distended. No masses, organomegaly  Vulva:  No external lesions, normal female hair distribution, no inguinal adenopathy.    Urethra:  Midline, non-tender, well supported, no discharge  Vagina:  Atrophic, no abnormal discharge, no lesions  Cervix: nontender  Uterus:  anteverted, smooth contour, without enlargement, mobile, and without tenderness  Ovaries:  No masses appreciated, non-tender, mobile  Rectal Exam: deferred  Musculoskeletal: extremities normal  Skin: no suspicious lesions or rashes  Psychiatric: Affect appropriate, cooperative, mentation appears normal.     COUNSELING:   Reviewed preventive health counseling, as reflected in patient instructions       (Beata)menopause management   reports that she has quit smoking. She has never used smokeless tobacco.        FRAX Risk Assessment  ASSESSMENT:  64 year old  with satisfactory annual exam  (Z01.419) Encounter for annual routine gynecological examination  (primary encounter  diagnosis)  Comment:   Plan: up to date on HM. Discussed vaginal dryness, recommend vulvar moisturizer like coconut oil or Vasoline as needed. She also questions a prior ovarian cyst on US--reviewed from 4-5 years ago, simple cyst 1.3 cm in largest diameter. No need for follow up unless she is symptomatic, which she is not. Exam normal without adnexal fullness or pain, so will not get US at this point. Discussed symptoms to watch for.    (N94.10) Female dyspareunia  Comment:   Plan: estradiol (VAGIFEM) 10 MCG TABS vaginal tablet        Renewed for one year.      Lolis Solano MD

## 2020-01-28 NOTE — NURSING NOTE
"Chief Complaint   Patient presents with     Physical     no questions or concerns.Last mammo was 2019- nl.       Initial /66 (BP Location: Right arm, Patient Position: Chair, Cuff Size: Adult Regular)   Wt 62.7 kg (138 lb 3.2 oz)   BMI 22.31 kg/m   Estimated body mass index is 22.31 kg/m  as calculated from the following:    Height as of 10/3/19: 1.676 m (5' 6\").    Weight as of this encounter: 62.7 kg (138 lb 3.2 oz).  BP completed using cuff size: regular    Questioned patient about current smoking habits.  Pt. has never smoked.          The following HM Due: NONE      Nirmala Sierra CMA             "

## 2020-08-12 DIAGNOSIS — N94.10 FEMALE DYSPAREUNIA: ICD-10-CM

## 2020-08-12 RX ORDER — ESTRADIOL 10 UG/1
INSERT VAGINAL
Qty: 30 TABLET | Refills: 1 | Status: SHIPPED | OUTPATIENT
Start: 2020-08-12 | End: 2021-01-20

## 2020-08-12 NOTE — TELEPHONE ENCOUNTER
Prescription approved per [facility] [place] [place] Refill Protocol.  Fe Jules RN

## 2020-09-10 ENCOUNTER — IMMUNIZATION (OUTPATIENT)
Dept: NURSING | Facility: CLINIC | Age: 65
End: 2020-09-10
Payer: COMMERCIAL

## 2020-09-10 PROCEDURE — 90471 IMMUNIZATION ADMIN: CPT

## 2020-09-10 PROCEDURE — 90662 IIV NO PRSV INCREASED AG IM: CPT

## 2020-12-31 ENCOUNTER — HOSPITAL ENCOUNTER (OUTPATIENT)
Dept: MAMMOGRAPHY | Facility: CLINIC | Age: 65
Discharge: HOME | End: 2020-12-31
Attending: OBSTETRICS & GYNECOLOGY | Admitting: OBSTETRICS & GYNECOLOGY
Payer: COMMERCIAL

## 2020-12-31 DIAGNOSIS — Z12.31 VISIT FOR SCREENING MAMMOGRAM: ICD-10-CM

## 2020-12-31 PROCEDURE — 77063 BREAST TOMOSYNTHESIS BI: CPT

## 2021-03-05 ENCOUNTER — IMMUNIZATION (OUTPATIENT)
Dept: NURSING | Facility: CLINIC | Age: 66
End: 2021-03-05
Payer: COMMERCIAL

## 2021-03-05 PROCEDURE — 0011A PR COVID VAC MODERNA 100 MCG/0.5 ML IM: CPT

## 2021-03-05 PROCEDURE — 91301 PR COVID VAC MODERNA 100 MCG/0.5 ML IM: CPT

## 2021-03-14 ENCOUNTER — HEALTH MAINTENANCE LETTER (OUTPATIENT)
Age: 66
End: 2021-03-14

## 2021-04-01 ASSESSMENT — ACTIVITIES OF DAILY LIVING (ADL): CURRENT_FUNCTION: NO ASSISTANCE NEEDED

## 2021-04-02 ENCOUNTER — IMMUNIZATION (OUTPATIENT)
Dept: NURSING | Facility: CLINIC | Age: 66
End: 2021-04-02
Attending: STUDENT IN AN ORGANIZED HEALTH CARE EDUCATION/TRAINING PROGRAM
Payer: COMMERCIAL

## 2021-04-02 PROCEDURE — 0012A PR COVID VAC MODERNA 100 MCG/0.5 ML IM: CPT

## 2021-04-02 PROCEDURE — 91301 PR COVID VAC MODERNA 100 MCG/0.5 ML IM: CPT

## 2021-04-06 ENCOUNTER — OFFICE VISIT (OUTPATIENT)
Dept: INTERNAL MEDICINE | Facility: CLINIC | Age: 66
End: 2021-04-06
Payer: COMMERCIAL

## 2021-04-06 VITALS
SYSTOLIC BLOOD PRESSURE: 110 MMHG | HEIGHT: 66 IN | BODY MASS INDEX: 20.41 KG/M2 | OXYGEN SATURATION: 100 % | TEMPERATURE: 97.5 F | WEIGHT: 127 LBS | HEART RATE: 83 BPM | DIASTOLIC BLOOD PRESSURE: 68 MMHG

## 2021-04-06 DIAGNOSIS — Z13.29 SCREENING FOR THYROID DISORDER: ICD-10-CM

## 2021-04-06 DIAGNOSIS — Z00.00 ENCOUNTER FOR MEDICARE ANNUAL WELLNESS EXAM: Primary | ICD-10-CM

## 2021-04-06 DIAGNOSIS — R94.6 ABNORMAL FINDING ON THYROID FUNCTION TEST: ICD-10-CM

## 2021-04-06 DIAGNOSIS — Z13.6 CARDIOVASCULAR SCREENING; LDL GOAL LESS THAN 160: ICD-10-CM

## 2021-04-06 LAB
ANION GAP SERPL CALCULATED.3IONS-SCNC: <1 MMOL/L (ref 3–14)
BUN SERPL-MCNC: 14 MG/DL (ref 7–30)
CALCIUM SERPL-MCNC: 9.9 MG/DL (ref 8.5–10.1)
CHLORIDE SERPL-SCNC: 103 MMOL/L (ref 94–109)
CHOLEST SERPL-MCNC: 211 MG/DL
CO2 SERPL-SCNC: 31 MMOL/L (ref 20–32)
CREAT SERPL-MCNC: 0.74 MG/DL (ref 0.52–1.04)
ERYTHROCYTE [DISTWIDTH] IN BLOOD BY AUTOMATED COUNT: 13.9 % (ref 10–15)
GFR SERPL CREATININE-BSD FRML MDRD: 83 ML/MIN/{1.73_M2}
GLUCOSE SERPL-MCNC: 86 MG/DL (ref 70–99)
HCT VFR BLD AUTO: 40.4 % (ref 35–47)
HDLC SERPL-MCNC: 57 MG/DL
HGB BLD-MCNC: 12.9 G/DL (ref 11.7–15.7)
LDLC SERPL CALC-MCNC: 134 MG/DL
MCH RBC QN AUTO: 29.1 PG (ref 26.5–33)
MCHC RBC AUTO-ENTMCNC: 31.9 G/DL (ref 31.5–36.5)
MCV RBC AUTO: 91 FL (ref 78–100)
NONHDLC SERPL-MCNC: 154 MG/DL
PLATELET # BLD AUTO: 331 10E9/L (ref 150–450)
POTASSIUM SERPL-SCNC: 4.5 MMOL/L (ref 3.4–5.3)
RBC # BLD AUTO: 4.43 10E12/L (ref 3.8–5.2)
SODIUM SERPL-SCNC: 134 MMOL/L (ref 133–144)
TRIGL SERPL-MCNC: 102 MG/DL
TSH SERPL DL<=0.005 MIU/L-ACNC: 1.75 MU/L (ref 0.4–4)
WBC # BLD AUTO: 6.8 10E9/L (ref 4–11)

## 2021-04-06 PROCEDURE — 80048 BASIC METABOLIC PNL TOTAL CA: CPT | Performed by: INTERNAL MEDICINE

## 2021-04-06 PROCEDURE — 80061 LIPID PANEL: CPT | Performed by: INTERNAL MEDICINE

## 2021-04-06 PROCEDURE — 85027 COMPLETE CBC AUTOMATED: CPT | Performed by: INTERNAL MEDICINE

## 2021-04-06 PROCEDURE — 36415 COLL VENOUS BLD VENIPUNCTURE: CPT | Performed by: INTERNAL MEDICINE

## 2021-04-06 PROCEDURE — 84443 ASSAY THYROID STIM HORMONE: CPT | Performed by: INTERNAL MEDICINE

## 2021-04-06 PROCEDURE — G0438 PPPS, INITIAL VISIT: HCPCS | Performed by: INTERNAL MEDICINE

## 2021-04-06 ASSESSMENT — MIFFLIN-ST. JEOR: SCORE: 1127.82

## 2021-04-06 ASSESSMENT — ACTIVITIES OF DAILY LIVING (ADL): CURRENT_FUNCTION: NO ASSISTANCE NEEDED

## 2021-04-06 NOTE — PATIENT INSTRUCTIONS
"  The 10-year ASCVD risk score (Unique WARE Jr., et al., 2013) is: 5.1%    Values used to calculate the score:      Age: 65 years      Sex: Female      Is Non- : No      Diabetic: No      Tobacco smoker: No      Systolic Blood Pressure: 110 mmHg      Is BP treated: No      HDL Cholesterol: 55 mg/dL      Total Cholesterol: 190 mg/dL            5 GOALS TO PREVENT VASCULAR DISEASE:     1.  Aggressive blood pressure control, under 130/80 ideally.  Using medications if needed.    Your blood pressure is under good control    BP Readings from Last 4 Encounters:   04/06/21 110/68   01/28/20 116/66   10/03/19 122/70   06/07/19 102/64       2.  Aggressive LDL cholesterol (\"bad cholesterol\") lowering as indicated.    Your goal is an LDL under 130 for sure, preferably under 100.  (If you have diabetes or previous vascular disease, the the LDL goals would be under 100 for sure, preferably under 70.)    New guidelines identify four high-risk groups who could benefit from statins:   *people with pre-existing heart disease, such as those who have had a heart attack;   *people ages 40 to 75 who have diabetes of any type  *patients ages 40 to 75 with at least a 7.5% risk of developing cardiovascular disease over the next decade, according to a formula described in the guidelines  *patients with the sort of super-high cholesterol that sometimes runs in families, as evidenced by an LDL of 190 milligrams per deciliter or higher    Your cholesterol levels are well controlled.    Recent Labs   Lab Test 10/03/19  1049 06/07/19  1115 06/22/15  0751 06/22/15  0751 05/05/14  0928   CHOL 190 197   < > 190 176   HDL 55 52   < > 48* 55   * 125*   < > 105 100   TRIG 128 100   < > 187* 108   CHOLHDLRATIO  --   --   --  4.0 3.2    < > = values in this interval not displayed.       3.  Aggressive diabetic prevention, screening and/or management.      You do not have diabetes as of the most recent blood tests.     4.  No " smoking    5.  Consider daily preventative aspirin over age 50 if you have enough cardiac risk factors to place you at higher risk for the presence of vascular disease.    If you have any reason not to take aspirin such easy bruising or bleeding, stomach problems, other anticoagulant medications, or any other side effects, then you should not take Aspirin.      --Based on your relative lack of current cardiac risk factors, you do NOT need to take preventative aspirin.            Preventive Health Recommendations  Female Ages 50 - 64    Yearly exam: See your health care provider every year in order to  o Review health changes.   o Discuss preventive care.    o Review your medicines if your doctor has prescribed any.      Get a Pap test every three years (unless you have an abnormal result and your provider advises testing more often).    If you get Pap tests with HPV test, you only need to test every 5 years, unless you have an abnormal result.     You do not need a Pap test if your uterus was removed (hysterectomy) and you have not had cancer.    You should be tested each year for STDs (sexually transmitted diseases) if you're at risk.     Have a mammogram every 1 to 2 years.    Have a colonoscopy at age 50, or have a yearly FIT test (stool test). These exams screen for colon cancer.      Have a cholesterol test every 5 years, or more often if advised.    Have a diabetes test (fasting glucose) every three years. If you are at risk for diabetes, you should have this test more often.     If you are at risk for osteoporosis (brittle bone disease), think about having a bone density scan (DEXA).    Shots: Get a flu shot each year. Get a tetanus shot every 10 years.    Nutrition:     Eat at least 5 servings of fruits and vegetables each day.    Eat whole-grain bread, whole-wheat pasta and brown rice instead of white grains and rice.    Talk to your provider about Calcium and Vitamin D.        --Good Grains:  Oats, brown  "rice, Quinoa (these do not raise the blood sugar as much)     --Bad grains:  Anything made from wheat or white rice     (because these raise the blood sugars significantly, and the possible gluten issue from wheat for some people).      --Proteins:  Aim for \"lean proteins\" including chicken, fish, seafood, pork, turkey, and eggs (in moderation); Eat red meat only occasionally    Lifestyle    Exercise at least 150 minutes a week (30 minutes a day, 5 days a week). This will help you control your weight and prevent disease.    Limit alcohol to one drink per day.    No smoking.     Wear sunscreen to prevent skin cancer.     See your dentist every six months for an exam and cleaning.    See your eye doctor every 1 to 2 years.          Patient Education   Personalized Prevention Plan  You are due for the preventive services outlined below.  Your care team is available to assist you in scheduling these services.  If you have already completed any of these items, please share that information with your care team to update in your medical record.  Health Maintenance Due   Topic Date Due     Osteoporosis Screening  04/27/2020     Cholesterol Lab  10/03/2020     FALL RISK ASSESSMENT  10/03/2020       Understanding USDA MyPlate  The USDA has guidelines to help you make healthy food choices. These are called MyPlate. MyPlate shows the food groups that make up healthy meals using the image of a place setting. Before you eat, think about the healthiest choices for what to put on your plate or in your cup or bowl. To learn more about building a healthy plate, visit www.choosemyplate.gov.     The food groups    Fruits. Any fruit or 100% fruit juice counts as part of the Fruit Group. Fruits may be fresh, canned, frozen, or dried, and may be whole, cut-up, or pureed. Make 1/2 of your plate fruits and vegetables.    Vegetables. Any vegetable or 100% vegetable juice counts as a member of the Vegetable Group. Vegetables may be fresh, " frozen, canned, or dried. They can be served raw or cooked and may be whole, cut-up, or mashed. Make 1/2 of your plate fruits and vegetables.    Grains. All foods made from grains are part of the Grains Group. These include wheat, rice, oats, cornmeal, and barley. Grains are often used to make foods such as bread, pasta, oatmeal, cereal, tortillas, and grits. Grains should be no more than 1/4 of your plate. At least half of your grains should be whole grains.    Protein. This group includes meat, poultry, seafood, beans and peas, eggs, processed soy products (such as tofu), nuts (including nut butters), and seeds. Make protein choices no more than 1/4 of your plate. Meat and poultry choices should be lean or low fat.    Dairy. The Dairy Group includes all fluid milk products and foods made from milk that contain calcium, such as yogurt and cheese. (Foods that have little calcium, such as cream, butter, and cream cheese, are not part of this group.) Most dairy choices should be low-fat or fat-free.    Oils. Oils aren't a food group, but they do contain essential nutrients. However it's important to watch your intake of oils. These are fats that are liquid at room temperature. They include canola, corn, olive, soybean, vegetable, and sunflower oil. Foods that are mainly oil include mayonnaise, certain salad dressings, and soft margarines. You likely already get your daily oil allowance from the foods you eat.  Things to limit  Eating healthy also means limiting these things in your diet:    Salt (sodium). Many processed foods have a lot of sodium. To keep sodium intake down, eat fresh vegetables, meats, poultry, and seafood when possible. Purchase low-sodium, reduced-sodium, or no-salt-added food products at the store. And don't add salt to your meals at home. Instead, season them with herbs and spices such as dill, oregano, cumin, and paprika. Or try adding flavor with lemon or lime zest and juice.    Saturated fat.  Saturated fats are most often found in animal products such as beef, pork, and chicken. They are often solid at room temperature, such as butter. To reduce your saturated fat intake, choose leaner cuts of meat and poultry. And try healthier cooking methods such as grilling, broiling, roasting, or baking. For a simple lower-fat swap, use plain nonfat yogurt instead of mayonnaise when making potato salad or macaroni salad.    Added sugars. These are sugars added to foods. They are in foods such as ice cream, candy, soda, fruit drinks, sports drinks, energy drinks, cookies, pastries, jams, and syrups. Cut down on added sugars by sharing sweet treats with a family member or friend. You can also choose fruit for dessert, and drink water or other unsweetened beverages.  Suzanna last reviewed this educational content on 6/1/2020 2000-2020 [employer]. All rights reserved. This information is not intended as a substitute for professional medical care. Always follow your healthcare professional's instructions.

## 2021-04-06 NOTE — PROGRESS NOTES
"SUBJECTIVE:   Amber Black is a 65 year old female who presents for Preventive Visit.      Patient has been advised of split billing requirements and indicates understanding: Yes   Are you in the first 12 months of your Medicare coverage?  No    Healthy Habits:     In general, how would you rate your overall health?  Good    Frequency of exercise:  4-5 days/week    Duration of exercise:  30-45 minutes    Do you usually eat at least 4 servings of fruit and vegetables a day, include whole grains    & fiber and avoid regularly eating high fat or \"junk\" foods?  No    Taking medications regularly:  Yes    Medication side effects:  Not applicable    Ability to successfully perform activities of daily living:  No assistance needed    Home Safety:  No safety concerns identified    Hearing Impairment:  No hearing concerns    In the past 6 months, have you been bothered by leaking of urine?  No    In general, how would you rate your overall mental or emotional health?  Good      PHQ-2 Total Score: 0    Additional concerns today:  No    Do you feel safe in your environment? Yes    Have you ever done Advance Care Planning? (For example, a Health Directive, POLST, or a discussion with a medical provider or your loved ones about your wishes): Yes, patient states has an Advance Care Planning document and will bring a copy to the clinic.       Fall risk  Fallen 2 or more times in the past year?: No  Any fall with injury in the past year?: No    Cognitive Screening   1) Repeat 3 items (Leader, Season, Table)    2) Clock draw: NORMAL  3) 3 item recall: Recalls 3 objects  Results: 3 items recalled: COGNITIVE IMPAIRMENT LESS LIKELY    Mini-CogTM Copyright EITAN Guy. Licensed by the author for use in [facility]; reprinted with permission (johnnie@.[facility]). All rights reserved.      Do you have sleep apnea, excessive snoring or daytime drowsiness?: no    Reviewed and updated as needed this visit by clinical staff  Tobacco  " "Allergies  Meds  Problems  Med Hx  Surg Hx  Fam Hx          Reviewed and updated as needed this visit by Provider  Tobacco  Allergies  Meds  Problems  Med Hx  Surg Hx  Fam Hx         Social History     Tobacco Use     Smoking status: Former Smoker     Smokeless tobacco: Never Used     Tobacco comment: quit 1980   Substance Use Topics     Alcohol use: No         No flowsheet data found.            Current providers sharing in care for this patient include:   Patient Care Team:  Felisha Lindo PA-C as PCP - General (Physician Assistant)  Felisha Lindo PA-C as Assigned PCP  Lolis Solano MD as Assigned OBGYN Provider    The following health maintenance items are reviewed in Epic and correct as of today:  Health Maintenance Due   Topic Date Due     DEXA  04/27/2020     FALL RISK ASSESSMENT  10/03/2020         Any new diagnosis of family breast, ovarian, or bowel cancer? No    FSH-7: No flowsheet data found.      Pertinent mammograms are reviewed under the imaging tab.      **I reviewed the information recorded in the patient's EPIC chart (including but not limited to medical history, surgical history, family history, problem list, medication list, and allergy list) and updated the information as indicated based on the patients reported information.         Review of Systems  Constitutional, HEENT, cardiovascular, pulmonary, gi and gu systems are negative, except as otherwise noted.    OBJECTIVE:   /68   Pulse 83   Temp 97.5  F (36.4  C) (Temporal)   Ht 1.676 m (5' 6\")   Wt 57.6 kg (127 lb)   SpO2 100%   BMI 20.50 kg/m   Estimated body mass index is 20.5 kg/m  as calculated from the following:    Height as of this encounter: 1.676 m (5' 6\").    Weight as of this encounter: 57.6 kg (127 lb).  Physical Exam  GENERAL alert and no distress  EYES:  Normal sclera,conjunctiva, EOMI  HENT: oral and posterior pharynx without lesions or erythema, facies symmetric  NECK: Neck " supple. No LAD, without thyroidmegaly.  RESP: Clear to ausculation bilaterally without wheezes or crackles. Normal BS in all fields.  CV: RRR normal S1S2 without murmurs, rubs or gallops.  LYMPH: no cervical lymph adenopathy appreciated  MS: extremities- no gross deformities of the visible extremities noted,   EXT:  no lower extremity edema  PSYCH: Alert and oriented times 3; speech- coherent  SKIN:  No obvious significant skin lesions on visible portions of face         ASSESSMENT / PLAN:     (Z00.00) Encounter for Medicare annual wellness exam  (primary encounter diagnosis)  Comment: Discussed cardiac disease risk factors and cardiac disease risk factor modification, including diabetes screening, blood pressure screening (and management if indicated), and cholesterol screening.   Reviewed immunzation guidelines, including pneumococcal vaccines, annual influenza, and shingles vaccines.   Discussed routine cancer screenings, including skin cancer, colon cancer screening for everyone until age 80, prostate cancer screening in men until age 75, mammogram and PAP/pelvic for women until age 75.   Recommended regular dentist visits to care for remaining teeth.   Recommended regular screening for vision and glaucoma.   Recommended safe driving and accident avoidance.   Plan:     (Z13.29) Screening for thyroid disorder  Comment:   Plan: TSH with free T4 reflex            (Z13.6) CARDIOVASCULAR SCREENING; LDL GOAL LESS THAN 160  Comment: Discussed cardiac disease risk factors and cardiac disease risk factor modification.   Plan: CBC with platelets, Lipid panel reflex to         direct LDL Fasting, Basic metabolic panel            (R94.6) Abnormal finding on thyroid function test  Comment: prior abnormal TSH in past years, normal last time.   Recheck again.   Plan: TSH with free T4 reflex               Patient has been advised of split billing requirements and indicates understanding: Yes  COUNSELING:  Reviewed preventive  "health counseling, as reflected in patient instructions       Regular exercise       Healthy diet/nutrition       Vision screening       Hearing screening       Dental care       Bladder control    Estimated body mass index is 20.5 kg/m  as calculated from the following:    Height as of this encounter: 1.676 m (5' 6\").    Weight as of this encounter: 57.6 kg (127 lb).        She reports that she has quit smoking. She has never used smokeless tobacco.      Appropriate preventive services were discussed with this patient, including applicable screening as appropriate for cardiovascular disease, diabetes, osteopenia/osteoporosis, and glaucoma.  As appropriate for age/gender, discussed screening for colorectal cancer, prostate cancer, breast cancer, and cervical cancer. Checklist reviewing preventive services available has been given to the patient.    Reviewed patients plan of care and provided an AVS. The  for Amber meets the Care Plan requirement. This Care Plan has been established and reviewed with the .    Counseling Resources:  ATP IV Guidelines  Pooled Cohorts Equation Calculator  Breast Cancer Risk Calculator  Breast Cancer: Medication to Reduce Risk  FRAX Risk Assessment  ICSI Preventive Guidelines  Dietary Guidelines for Americans, 2010  USDA's MyPlate  ASA Prophylaxis  Lung CA Screening    Ba Alvarenga MD  [facility]    Identified Health Risks:  "

## 2021-05-14 ENCOUNTER — OFFICE VISIT (OUTPATIENT)
Dept: OBGYN | Facility: CLINIC | Age: 66
End: 2021-05-14
Payer: COMMERCIAL

## 2021-05-14 VITALS — SYSTOLIC BLOOD PRESSURE: 102 MMHG | BODY MASS INDEX: 20.9 KG/M2 | WEIGHT: 129.5 LBS | DIASTOLIC BLOOD PRESSURE: 60 MMHG

## 2021-05-14 DIAGNOSIS — Z12.31 ENCOUNTER FOR SCREENING MAMMOGRAM FOR BREAST CANCER: Primary | ICD-10-CM

## 2021-05-14 DIAGNOSIS — Z01.419 WOMEN'S ANNUAL ROUTINE GYNECOLOGICAL EXAMINATION: ICD-10-CM

## 2021-05-14 PROCEDURE — 99397 PER PM REEVAL EST PAT 65+ YR: CPT | Performed by: OBSTETRICS & GYNECOLOGY

## 2021-05-14 NOTE — PROGRESS NOTES
Amber is a 65 year old  who presents for annual exam.   Postmenopausal.  She is having no menopausal symptoms. No vaginal bleeding noted.     Besides routine health maintenance, she has no other health concerns today .  GYNECOLOGIC HISTORY:  She is sexually active with 1 male partner(s) and she is currently in monogamous relationship.    History sexually transmitted infections:No STD history  Estrogen replacement therapy: Yes -  Vaginal only     History of abnormal Pap smear: NO - age 30-65 PAP every 5 years with negative HPV co-testing recommended  Family history of breast CA: No  Family history of uterine/ovarian CA: No  Family history of colon CA: No      HEALTH MAINTENANCE:  Reviewed.    HISTORY:  OB History    Para Term  AB Living   1 0 0 0 1 0   SAB TAB Ectopic Multiple Live Births   0 1 0 0 0      # Outcome Date GA Lbr Jaylon/2nd Weight Sex Delivery Anes PTL Lv   1 TAB      TAB   DEC     Past Medical History:   Diagnosis Date     Irritable bowel syndrome     stable     Past Surgical History:   Procedure Laterality Date     TUBAL LIGATION  age 28     ZZC NONSPECIFIC PROCEDURE      septoplasty and polypectomy     ZZC NONSPECIFIC PROCEDURE      wisdom teeth     Family History   Problem Relation Age of Onset     Diabetes Mother         B:192     Hypertension Mother      Musculoskeletal Disorder Mother         spine problems     Alzheimer Disease Mother 84     Family History Negative Father         B:192     Psychotic Disorder Brother         bi-polar, alcholism,  @ age 70     Coronary Artery Disease Brother      C.A.D. Brother         Aortic aneurysm     Lung Cancer Brother         Stage 4 -  2018     Social History     Socioeconomic History     Marital status:      Spouse name: Michael     Number of children: 0     Years of education: Not on file     Highest education level: Not on file   Occupational History     Employer: NONE    Social Needs     Financial resource strain:  Not on file     Food insecurity     Worry: Not on file     Inability: Not on file     Transportation needs     Medical: Not on file     Non-medical: Not on file   Tobacco Use     Smoking status: Former Smoker     Smokeless tobacco: Never Used     Tobacco comment: quit 1980   Substance and Sexual Activity     Alcohol use: No     Drug use: No     Sexual activity: Yes     Partners: Male   Lifestyle     Physical activity     Days per week: Not on file     Minutes per session: Not on file     Stress: Not on file   Relationships     Social connections     Talks on phone: Not on file     Gets together: Not on file     Attends Anglican service: Not on file     Active member of club or organization: Not on file     Attends meetings of clubs or organizations: Not on file     Relationship status: Not on file     Intimate partner violence     Fear of current or ex partner: Not on file     Emotionally abused: Not on file     Physically abused: Not on file     Forced sexual activity: Not on file   Other Topics Concern      Service No     Blood Transfusions No     Caffeine Concern No     Occupational Exposure No     Hobby Hazards No     Sleep Concern No     Stress Concern No     Weight Concern No     Special Diet Yes     Comment: weight watchers     Back Care No     Exercise Yes     Comment: 4-5 days per week     Bike Helmet Yes     Seat Belt Yes     Self-Exams Yes   Social History Narrative    Living arrangements - the patient lives with her spouse.        Current Outpatient Medications:      Acetaminophen (TYLENOL EXTRA STRENGTH PO), Take 1 tablet by mouth as needed 2 bid, Disp: , Rfl:      estradiol (VAGIFEM) 10 MCG TABS vaginal tablet, place 1 tablet vaginally every 3 days, Disp: 30 tablet, Rfl: 0     OVER-THE-COUNTER, FLONASE, Disp: , Rfl:      OXYTROL 3.9 MG/24HR BIW patch, PLACE 1 PATCH ONTO THE SKIN TWICE A WEEK (EVERY 4 DAYS), Disp: 24 patch, Rfl: 8  Allergies   Allergen Reactions     Levaquin Hives     Levaquin  induced fixed drug eruption.  Biopsy proven by dermatologist.       Cyclobenzaprine Hcl      drowsiness     Pravastatin Muscle Pain (Myalgia)     Seafood Nausea and Vomiting       Past medical, surgical, social and family history were reviewed and updated in EPIC.    ROS:  12 point review of systems negative other than symptoms noted below.      EXAM:  There were no vitals taken for this visit.   BMI: There is no height or weight on file to calculate BMI.  Constitutional: healthy, alert and no distress  Respiratory: Negative.   Breast: No nodularity, asymmetry or nipple discharge bilaterally.  Gastrointestinal: Abdomen soft, non-tender, non-distended. No masses, organomegaly  Vulva:  No external lesions, normal female hair distribution, no inguinal adenopathy.    Urethra:  Midline, non-tender, well supported, no discharge  Vagina:  Atrophic, no abnormal discharge, no lesions  Cervix: no lesions, no discharge and nontender  Uterus:  anteverted, smooth contour, without enlargement, mobile, and without tenderness  Ovaries:  No masses appreciated, non-tender, mobile  Rectal Exam: deferred  Musculoskeletal: extremities normal  Skin: no suspicious lesions or rashes  Psychiatric: Affect appropriate, cooperative, mentation appears normal.     COUNSELING:   Reviewed preventive health counseling, as reflected in patient instructions       (Beata)menopause management   reports that she has quit smoking. She has never used smokeless tobacco.        FRAX Risk Assessment  ASSESSMENT:  65 year old  with satisfactory annual exam  (Z12.31) Encounter for screening mammogram for breast cancer  (primary encounter diagnosis)  Comment:   Plan: MA Screen Bilateral w/Kenneth        Up to date, ordered for this /winter.    (Z01.419) Women's annual routine gynecological examination  Comment:   Plan: Renew vagifem when needed, continue with yearly exams.    Lolis Solano MD

## 2021-05-14 NOTE — NURSING NOTE
"Chief Complaint   Patient presents with     Physical     Pap 2016   Mammo 2020        Initial /60 (BP Location: Right arm, Cuff Size: Adult Regular)   Wt 58.7 kg (129 lb 8 oz)   Breastfeeding No   BMI 20.90 kg/m   Estimated body mass index is 20.9 kg/m  as calculated from the following:    Height as of 21: 1.676 m (5' 6\").    Weight as of this encounter: 58.7 kg (129 lb 8 oz).  BP completed using cuff size: regular    Questioned patient about current smoking habits.  Pt. quit smoking some time ago.          The following HM Due: NONE      Barbie Woodard, GABY on 2021 at 9:09 AM  "

## 2021-11-17 ENCOUNTER — TRANSFERRED RECORDS (OUTPATIENT)
Dept: HEALTH INFORMATION MANAGEMENT | Facility: CLINIC | Age: 66
End: 2021-11-17
Payer: COMMERCIAL

## 2022-01-07 DIAGNOSIS — N94.10 FEMALE DYSPAREUNIA: ICD-10-CM

## 2022-01-07 RX ORDER — ESTRADIOL 10 UG/1
INSERT VAGINAL
Qty: 24 TABLET | Refills: 1 | Status: SHIPPED | OUTPATIENT
Start: 2022-01-07 | End: 2022-09-09

## 2022-01-10 ENCOUNTER — ANCILLARY PROCEDURE (OUTPATIENT)
Dept: MAMMOGRAPHY | Facility: CLINIC | Age: 67
End: 2022-01-10
Attending: OBSTETRICS & GYNECOLOGY
Payer: COMMERCIAL

## 2022-01-10 DIAGNOSIS — Z12.31 VISIT FOR SCREENING MAMMOGRAM: ICD-10-CM

## 2022-01-10 DIAGNOSIS — Z12.31 ENCOUNTER FOR SCREENING MAMMOGRAM FOR BREAST CANCER: ICD-10-CM

## 2022-01-10 PROCEDURE — 77063 BREAST TOMOSYNTHESIS BI: CPT | Mod: TC | Performed by: RADIOLOGY

## 2022-01-10 PROCEDURE — 77067 SCR MAMMO BI INCL CAD: CPT | Mod: TC | Performed by: RADIOLOGY

## 2022-05-18 ENCOUNTER — MYC MEDICAL ADVICE (OUTPATIENT)
Dept: OBGYN | Facility: CLINIC | Age: 67
End: 2022-05-18
Payer: COMMERCIAL

## 2022-06-28 ASSESSMENT — ENCOUNTER SYMPTOMS
CONSTIPATION: 0
CHILLS: 0
WEAKNESS: 0
COUGH: 0
MYALGIAS: 0
SORE THROAT: 0
HEARTBURN: 0
ABDOMINAL PAIN: 0
DIZZINESS: 0
ARTHRALGIAS: 0
FEVER: 0
EYE PAIN: 0
HEADACHES: 0
NAUSEA: 0
DIARRHEA: 0
PARESTHESIAS: 0
JOINT SWELLING: 0
DYSURIA: 0
SHORTNESS OF BREATH: 0
BREAST MASS: 0
NERVOUS/ANXIOUS: 1
HEMATURIA: 0
FREQUENCY: 0
PALPITATIONS: 0
HEMATOCHEZIA: 0

## 2022-06-28 ASSESSMENT — ACTIVITIES OF DAILY LIVING (ADL): CURRENT_FUNCTION: NO ASSISTANCE NEEDED

## 2022-07-05 ENCOUNTER — OFFICE VISIT (OUTPATIENT)
Dept: INTERNAL MEDICINE | Facility: CLINIC | Age: 67
End: 2022-07-05
Payer: COMMERCIAL

## 2022-07-05 VITALS
BODY MASS INDEX: 19.69 KG/M2 | OXYGEN SATURATION: 99 % | WEIGHT: 122.5 LBS | SYSTOLIC BLOOD PRESSURE: 112 MMHG | TEMPERATURE: 97.9 F | HEART RATE: 77 BPM | HEIGHT: 66 IN | DIASTOLIC BLOOD PRESSURE: 62 MMHG

## 2022-07-05 DIAGNOSIS — Z00.00 ENCOUNTER FOR MEDICARE ANNUAL WELLNESS EXAM: Primary | ICD-10-CM

## 2022-07-05 DIAGNOSIS — M85.80 OSTEOPENIA, UNSPECIFIED LOCATION: ICD-10-CM

## 2022-07-05 DIAGNOSIS — N32.89 BLADDER SPASMS: ICD-10-CM

## 2022-07-05 DIAGNOSIS — Z13.220 SCREENING FOR HYPERLIPIDEMIA: ICD-10-CM

## 2022-07-05 DIAGNOSIS — Z13.6 CARDIOVASCULAR SCREENING; LDL GOAL LESS THAN 130: ICD-10-CM

## 2022-07-05 DIAGNOSIS — Z78.0 POST-MENOPAUSAL: ICD-10-CM

## 2022-07-05 DIAGNOSIS — N94.10 FEMALE DYSPAREUNIA: ICD-10-CM

## 2022-07-05 DIAGNOSIS — F51.01 PRIMARY INSOMNIA: ICD-10-CM

## 2022-07-05 LAB
ANION GAP SERPL CALCULATED.3IONS-SCNC: 6 MMOL/L (ref 3–14)
BUN SERPL-MCNC: 13 MG/DL (ref 7–30)
CALCIUM SERPL-MCNC: 9.1 MG/DL (ref 8.5–10.1)
CHLORIDE BLD-SCNC: 103 MMOL/L (ref 94–109)
CHOLEST SERPL-MCNC: 211 MG/DL
CO2 SERPL-SCNC: 28 MMOL/L (ref 20–32)
CREAT SERPL-MCNC: 0.68 MG/DL (ref 0.52–1.04)
ERYTHROCYTE [DISTWIDTH] IN BLOOD BY AUTOMATED COUNT: 13.2 % (ref 10–15)
FASTING STATUS PATIENT QL REPORTED: YES
GFR SERPL CREATININE-BSD FRML MDRD: >90 ML/MIN/1.73M2
GLUCOSE BLD-MCNC: 94 MG/DL (ref 70–99)
HCT VFR BLD AUTO: 40.7 % (ref 35–47)
HDLC SERPL-MCNC: 65 MG/DL
HGB BLD-MCNC: 13 G/DL (ref 11.7–15.7)
LDLC SERPL CALC-MCNC: 132 MG/DL
MCH RBC QN AUTO: 29 PG (ref 26.5–33)
MCHC RBC AUTO-ENTMCNC: 31.9 G/DL (ref 31.5–36.5)
MCV RBC AUTO: 91 FL (ref 78–100)
NONHDLC SERPL-MCNC: 146 MG/DL
PLATELET # BLD AUTO: 298 10E3/UL (ref 150–450)
POTASSIUM BLD-SCNC: 4 MMOL/L (ref 3.4–5.3)
RBC # BLD AUTO: 4.48 10E6/UL (ref 3.8–5.2)
SODIUM SERPL-SCNC: 137 MMOL/L (ref 133–144)
TRIGL SERPL-MCNC: 72 MG/DL
WBC # BLD AUTO: 7.2 10E3/UL (ref 4–11)

## 2022-07-05 PROCEDURE — G0439 PPPS, SUBSEQ VISIT: HCPCS | Performed by: INTERNAL MEDICINE

## 2022-07-05 PROCEDURE — 80061 LIPID PANEL: CPT | Performed by: INTERNAL MEDICINE

## 2022-07-05 PROCEDURE — 99214 OFFICE O/P EST MOD 30 MIN: CPT | Mod: 25 | Performed by: INTERNAL MEDICINE

## 2022-07-05 PROCEDURE — 85027 COMPLETE CBC AUTOMATED: CPT | Performed by: INTERNAL MEDICINE

## 2022-07-05 PROCEDURE — 36415 COLL VENOUS BLD VENIPUNCTURE: CPT | Performed by: INTERNAL MEDICINE

## 2022-07-05 PROCEDURE — 80048 BASIC METABOLIC PNL TOTAL CA: CPT | Performed by: INTERNAL MEDICINE

## 2022-07-05 ASSESSMENT — ENCOUNTER SYMPTOMS
SHORTNESS OF BREATH: 0
NAUSEA: 0
CHILLS: 0
DYSURIA: 0
PALPITATIONS: 0
HEMATOCHEZIA: 0
NERVOUS/ANXIOUS: 1
WEAKNESS: 0
HEADACHES: 0
MYALGIAS: 0
ARTHRALGIAS: 0
CONSTIPATION: 0
HEMATURIA: 0
ABDOMINAL PAIN: 0
FEVER: 0
PARESTHESIAS: 0
HEARTBURN: 0
FREQUENCY: 0
JOINT SWELLING: 0
EYE PAIN: 0
SORE THROAT: 0
COUGH: 0
DIARRHEA: 0
DIZZINESS: 0
BREAST MASS: 0

## 2022-07-05 ASSESSMENT — ACTIVITIES OF DAILY LIVING (ADL): CURRENT_FUNCTION: NO ASSISTANCE NEEDED

## 2022-07-05 NOTE — PROGRESS NOTES
"    The patient was provided with suggestions to help her develop a healthy emotional lifestyle.  Answers for HPI/ROS submitted by the patient on 6/28/2022  In general, how would you rate your overall physical health?: good  Frequency of exercise:: 2-3 days/week  Do you usually eat at least 4 servings of fruit and vegetables a day, include whole grains & fiber, and avoid regularly eating high fat or \"junk\" foods? : Yes  Taking medications regularly:: Yes  Medication side effects:: Not applicable  Activities of Daily Living: no assistance needed  Home safety: no safety concerns identified  Hearing Impairment:: no hearing concerns  In the past 6 months, have you been bothered by leaking of urine?: No  abdominal pain: No  Blood in stool: No  Blood in urine: No  chest pain: No  chills: No  congestion: No  constipation: No  cough: No  diarrhea: No  dizziness: No  ear pain: No  eye pain: No  nervous/anxious: Yes  fever: No  frequency: No  genital sores: No  headaches: No  hearing loss: No  heartburn: No  arthralgias: No  joint swelling: No  peripheral edema: No  mood changes: Yes  myalgias: No  nausea: No  dysuria: No  palpitations: No  Skin sensation changes: No  sore throat: No  urgency: No  rash: No  shortness of breath: No  visual disturbance: No  weakness: No  pelvic pain: No  vaginal bleeding: No  vaginal discharge: No  tenderness: No  breast mass: No  breast discharge: No  In general, how would you rate your overall mental or emotional health?: fair  Additional concerns today:: No  Duration of exercise:: 30-45 minutes        "

## 2022-07-05 NOTE — PROGRESS NOTES
"SUBJECTIVE:   Amber Black is a 67 year old female who presents for Preventive Visit.      Patient has been advised of split billing requirements and indicates understanding: Yes  Are you in the first 12 months of your Medicare coverage?  No    Healthy Habits:     In general, how would you rate your overall health?  Good    Frequency of exercise:  2-3 days/week    Duration of exercise:  30-45 minutes    Do you usually eat at least 4 servings of fruit and vegetables a day, include whole grains    & fiber and avoid regularly eating high fat or \"junk\" foods?  Yes    Taking medications regularly:  Yes    Medication side effects:  Not applicable    Ability to successfully perform activities of daily living:  No assistance needed    Home Safety:  No safety concerns identified    Hearing Impairment:  No hearing concerns    In the past 6 months, have you been bothered by leaking of urine?  No    In general, how would you rate your overall mental or emotional health?  Fair      PHQ-2 Total Score: 2    Additional concerns today:  No    Do you feel safe in your environment? YES    Have you ever done Advance Care Planning? (For example, a Health Directive, POLST, or a discussion with a medical provider or your loved ones about your wishes): No, advance care planning information given to patient to review.  Patient plans to discuss their wishes with loved ones or provider.         Fall risk  Fallen 2 or more times in the past year?: No  Any fall with injury in the past year?: No    Cognitive Screening   1) Repeat 3 items (Leader, Season, Table)    2) Clock draw: NORMAL  3) 3 item recall: Recalls 2 objects   Results: NORMAL clock, 1-2 items recalled: COGNITIVE IMPAIRMENT LESS LIKELY    Mini-CogTM Copyright EITAN Guy. Licensed by the author for use in [facility]; reprinted with permission (johnnie@.[facility]). All rights reserved.      Do you have sleep apnea, excessive snoring or daytime drowsiness?: no    Reviewed and " updated as needed this visit by clinical staff   Tobacco  Allergies  Meds   Med Hx  Surg Hx  Fam Hx  Soc Hx          Reviewed and updated as needed this visit by Provider       Med Hx  Surg Hx            Social History     Tobacco Use     Smoking status: Former Smoker     Years: 2.00     Types: Cigarettes     Smokeless tobacco: Never Used     Tobacco comment: Smoked casually during 1980-82   Substance Use Topics     Alcohol use: Not Currently         Alcohol Use 6/28/2022   Prescreen: >3 drinks/day or >7 drinks/week? Not Applicable   Prescreen: >3 drinks/day or >7 drinks/week? -           1.  History of osteopenia, reviewed most recent bone density studies.  No  recent fractures.    2.  Bladder spasms and mild urinary incontinence with stress.      3.  Reports ongoing sleep difficulties and insomnia.  We discussed ways manage this issue 4.      Current providers sharing in care for this patient include:   Patient Care Team:  Felisha Lindo PA-C as PCP - General (Physician Assistant)  Lolis Solano MD as Assigned OBGYN Provider  Ba Alvarenga MD as Assigned PCP    The following health maintenance items are reviewed in Epic and correct as of today:  Health Maintenance Due   Topic Date Due     DEXA  04/27/2020           Breast CA Risk Assessment (FHS-7) 6/28/2022   Do you have a family history of breast, colon, or ovarian cancer? No / Unknown         **I reviewed the information recorded in the patient's EPIC chart (including but not limited to medical history, surgical history, family history, problem list, medication list, and allergy list) and updated the information as indicated based on the patients reported information.           Pertinent mammograms are reviewed under the imaging tab.    Review of Systems   Constitutional: Negative for chills and fever.   HENT: Negative for congestion, ear pain, hearing loss and sore throat.    Eyes: Negative for pain and visual disturbance.  "  Respiratory: Negative for cough and shortness of breath.    Cardiovascular: Negative for chest pain, palpitations and peripheral edema.   Gastrointestinal: Negative for abdominal pain, constipation, diarrhea, heartburn, hematochezia and nausea.   Breasts:  Negative for tenderness, breast mass and discharge.   Genitourinary: Negative for dysuria, frequency, genital sores, hematuria, pelvic pain, urgency, vaginal bleeding and vaginal discharge.   Musculoskeletal: Negative for arthralgias, joint swelling and myalgias.   Skin: Negative for rash.   Neurological: Negative for dizziness, weakness, headaches and paresthesias.   Psychiatric/Behavioral: Positive for mood changes. The patient is nervous/anxious.      Constitutional, HEENT, cardiovascular, pulmonary, gi and gu systems are negative, except as otherwise noted.    OBJECTIVE:   /62   Pulse 77   Temp 97.9  F (36.6  C) (Temporal)   Ht 1.676 m (5' 6\")   Wt 55.6 kg (122 lb 8 oz)   SpO2 99%   BMI 19.77 kg/m   Estimated body mass index is 19.77 kg/m  as calculated from the following:    Height as of this encounter: 1.676 m (5' 6\").    Weight as of this encounter: 55.6 kg (122 lb 8 oz).  Physical Exam    GENERAL alert and no distress  EYES:  Normal sclera,conjunctiva, EOMI  HENT: oral and posterior pharynx without lesions or erythema, facies symmetric  NECK: Neck supple. No LAD, without thyroidmegaly.  RESP: Clear to ausculation bilaterally without wheezes or crackles. Normal BS in all fields.  CV: RRR normal S1S2 without murmurs, rubs or gallops.  LYMPH: no cervical lymph adenopathy appreciated  MS: extremities- no gross deformities of the visible extremities noted,   EXT:  no lower extremity edema  PSYCH: Alert and oriented times 3; speech- coherent  SKIN:  No obvious significant skin lesions on visible portions of face         ASSESSMENT / PLAN:     (Z00.00) Encounter for Medicare annual wellness exam  (primary encounter diagnosis)  Comment: Discussed " cardiac disease risk factors and cardiac disease risk factor modification, including diabetes screening, blood pressure screening (and management if indicated), and cholesterol screening.   Reviewed immunzation guidelines, including pneumococcal vaccines, annual influenza, and shingles vaccines.   Discussed routine cancer screenings, including skin cancer, colon cancer screening for everyone until age 80, prostate cancer screening in men until age 75, mammogram and PAP/pelvic for women until age 75.   Recommended regular dentist visits to care for remaining teeth.   Recommended regular screening for vision and glaucoma.   Recommended safe driving and accident avoidance.   Plan: REVIEW OF HEALTH MAINTENANCE PROTOCOL ORDERS,         CBC with platelets            (N94.10) Female dyspareunia  Comment:   Plan: REVIEW OF HEALTH MAINTENANCE PROTOCOL ORDERS,         CBC with platelets            (N32.89) Bladder spasms  Comment:   Plan: REVIEW OF HEALTH MAINTENANCE PROTOCOL ORDERS,         CBC with platelets            (F51.01) Primary insomnia  Comment: Discussed insomnia and sleep issues at length.  Insomnia is most often a symptom of something else rather than just pure insomnia.    Discussed sleep hygeine, especially eliminating any screens within 60 minutes of bedtime.   The best way to deal with insomnia is to address any underlying issues.    Discussed the role of medications and expectations of using such medications.    Recommended a trial of herbal/nutraceuticals: Melatonin 5 mg near bedtime (increasing to 10 mg if necessary), and/or MARIA G 750 mg.  Consider over-the-counter sleep aid such as doxylamine or diphenhydramine, being aware that these may cause drowsiness and potential urinary difficulties in men over 65.  Prescription sleep aids can be considered, usually these are reserved for refractory cases.   Plan:     (M85.80) Osteopenia, unspecified location  Comment: Reviewed prior DEXA scan results showing  "osteopenia.  There is no need for a specific medication for osteopenia/osteoporosis, but if it decreases further, on emay be indiacted.  * continue Calcium (at least 1200 mg per day) and Vitamin D (at least 2000 units per day) supplements either separately or together in the form of Caltrate (or similar product).   * regular exercise to keep muscles strong and weight stable  * no smoking  * avoid falls  *Repeat DEXA (bone density test) in this year (last done 2017).    Plan: REVIEW OF HEALTH MAINTENANCE PROTOCOL ORDERS,         DX Hip/Pelvis/Spine, CBC with platelets            (Z78.0) Post-menopausal  Comment:   Plan: REVIEW OF HEALTH MAINTENANCE PROTOCOL ORDERS,         DX Hip/Pelvis/Spine, CBC with platelets            (Z13.6) CARDIOVASCULAR SCREENING; LDL GOAL LESS THAN 130  Comment: Discussed cardiac disease risk factors and cardiac disease risk factor modification.   Plan: CBC with platelets, Basic metabolic panel,         Lipid panel reflex to direct LDL Fasting            (Z13.220) Screening for hyperlipidemia  Comment:   Plan: REVIEW OF HEALTH MAINTENANCE PROTOCOL ORDERS,         CBC with platelets, Lipid panel reflex to         direct LDL Fasting               Patient has been advised of split billing requirements and indicates understanding: Yes    COUNSELING:  Reviewed preventive health counseling, as reflected in patient instructions       Regular exercise       Healthy diet/nutrition       Vision screening       Hearing screening       Dental care       Bladder control       Fall risk prevention       Osteoporosis prevention/bone health       Colon cancer screening    Estimated body mass index is 19.77 kg/m  as calculated from the following:    Height as of this encounter: 1.676 m (5' 6\").    Weight as of this encounter: 55.6 kg (122 lb 8 oz).        She reports that she has quit smoking. Her smoking use included cigarettes. She quit after 2.00 years of use. She has never used smokeless " tobacco.      Appropriate preventive services were discussed with this patient, including applicable screening as appropriate for cardiovascular disease, diabetes, osteopenia/osteoporosis, and glaucoma.  As appropriate for age/gender, discussed screening for colorectal cancer, prostate cancer, breast cancer, and cervical cancer. Checklist reviewing preventive services available has been given to the patient.    Reviewed patients plan of care and provided an AVS. The  alexandra Clark meets the Care Plan requirement. This Care Plan has been established and reviewed with the .    Counseling Resources:  ATP IV Guidelines  Pooled Cohorts Equation Calculator  Breast Cancer Risk Calculator  Breast Cancer: Medication to Reduce Risk  FRAX Risk Assessment  ICSI Preventive Guidelines  Dietary Guidelines for Americans, 2010  USDA's MyPlate  ASA Prophylaxis  Lung CA Screening    Ba Alvarenga MD  [facility]    Identified Health Risks:

## 2022-07-05 NOTE — PATIENT INSTRUCTIONS
Heparin drip off, patient stable   Checking basic labs today, I will let you know if these results will result in any changes in therapy for you.      Continue all medications at the same doses.  Contact your usual pharmacy if you need refills.      Return to see me in 1 year, sooner if needed.  Use [employer] or Call [phone] to schedule the appointment with me.         OSTEOPENIA:     Get your bone density checked at your convenience.  osteoporosis happens very frequently in women over age 50.  Early treatment with sertain medications can lowe the risk of a fracture on your spine or hips.  There are now symptoms of osteopenia (mild thinning of bones) or osteoporosis ( moderate/major thinning of bones) until fractures happen.    I would also recomend the following:  * continue Calcium (at least 1200 mg per day) and Vitamin D (at least 400 IU) supplements either separately or together in the form of Caltrate (or similar product).   * regular exercise to keep muscles strong and weight stable  * no smoking  * avoid falls    If your bone density is low enough to require a medication, then I will contact you.      INSOMNIA:       Sleep Hygeine is incredibly important. (please review the information below)  Many times insomnia is a important symptom for underlying depression and/or anxiety.  It is important to manage any underlying mental health issues to help prevent the insomnia.    Avoid caffeine within 6 hours of bed, avoid alcohol at night, avoid late meals and late exercise, avoid late physical activity.   Keep your sleep environment cool, dark and quiet with comfortable bedding, pillow, and mattress.    Try gentle background sounds with a sound machine, or small fan.  Try to keep a regular sleep-wake schedule.    Pick an 8 hour window of time to be in bed and night and try to avoid sleep outside of that schedule as much as possible  Your bedtime and awakening time should fit your natural tendency to fall asleep and wake up and should not vary much  "between weekdays and weekends.   For stimulus control, avoid being in bed for more than 20 minutes if unable to sleep.  If you do get out of bed, keep the lights dim, read a book that is not particularly entertaining, and return to sleep if you start feeling drowsy.   Use the bedroom only for sleep and sex. Do NOT watch TV, read, use the computer, play games on your cell phone or do work while in bed.   Do not take naps during the daytime and avoid any situations where you might get drowsy or fall asleep unintentionally especially in the evening.   Practice deep breathing or other relaxation exercises.  Consider using an pradeep to help with relaxation like \"Breethe: Sleep and Meditation\", \"Calm\" or \"Devan: Meditation & Sleep\"  Do not use cell  phone, ipad, or TV or any device that emits \"blue light\". This causes stimulation of the brain.   Do not check email or perform any work related tasks within 30-60 minutes of bedtime, you need to avoid stimulation of the mind.    Don't lie in bed for more than 15-30 minutes if you can't sleep. Get up and go do something relaxing like reading until you become drowsy again and then go back to bed.   Trial of herbal sleep aids, these are safe, Follow the directions on the bottle.    --Melatonin, start 3 or 5 mg at bedtime, increase upwards to 10 mg.    --MARIA G 500-750 mg  Over the counter sleep aids Unison, Doxylamine, or diphenhydramine.  (Beware, these can possibly cause urinary troubles)  Most times, recurrent insomnia without an obvious cause is considered more of a symptom than a separate disease and many times can be a symptom of mood disorders such as depression and/or anxiety.    Read the following books for tips on manaing insomnia:   \"Say Good Night to Insomnia\" (by Aj Pittman)  \"The Sleep Solution:  Why Your Sleep is Broken, and How to Fix It\" (by Carlos Barkley)    If none of these measures help relieve insomnia, then return to see me.         5 GOALS TO PREVENT VASCULAR " "DISEASE:     1.  Aggressive blood pressure control, under 130/80 ideally.  Using medications if needed.    Your blood pressure is under good control    BP Readings from Last 4 Encounters:   07/05/22 112/62   05/14/21 102/60   04/06/21 110/68   01/28/20 116/66       2.  Aggressive LDL cholesterol (\"bad cholesterol\") lowering as indicated.    Your goal is an LDL under 130 for sure, preferably under 100.  (If you have diabetes or previous vascular disease, the the LDL goals would be under 100 for sure, preferably under 70.)    New guidelines identify four high-risk groups who could benefit from statins:   *people with pre-existing heart disease, such as those who have had a heart attack;   *people ages 40 to 75 who have diabetes of any type  *patients ages 40 to 75 with at least a 7.5% risk of developing cardiovascular disease over the next decade, according to a formula described in the guidelines  *patients with the sort of super-high cholesterol that sometimes runs in families, as evidenced by an LDL of 190 milligrams per deciliter or higher    Your cholesterol levels are well controlled.    Recent Labs   Lab Test 04/06/21  1109 10/03/19  1049 04/28/16  1111 06/22/15  0751 05/05/14  0928   CHOL 211* 190   < > 190 176   HDL 57 55   < > 48* 55   * 109*   < > 105 100   TRIG 102 128   < > 187* 108   CHOLHDLRATIO  --   --   --  4.0 3.2    < > = values in this interval not displayed.       3.  Aggressive diabetic prevention, screening and/or management.      You do not have diabetes as of the most recent blood tests.     4.  No smoking    5.  Consider daily preventative aspirin over age 50 if you have enough cardiac risk factors to place you at higher risk for the presence of vascular disease.    If you have any reason not to take aspirin such easy bruising or bleeding, stomach problems, other anticoagulant medications, or any other side effects, then you should not take Aspirin.     --Based on your current cardiac " "disease risk profile and/or age over 75, you do NOT need to take daily preventative aspirin.          Preventive Health Recommendations  Female Ages 50 - 64    Yearly exam: See your health care provider every year in order to  Review health changes.   Discuss preventive care.    Review your medicines if your doctor has prescribed any.    Get a Pap test every three years (unless you have an abnormal result and your provider advises testing more often).  If you get Pap tests with HPV test, you only need to test every 5 years, unless you have an abnormal result.   You do not need a Pap test if your uterus was removed (hysterectomy) and you have not had cancer.  You should be tested each year for STDs (sexually transmitted diseases) if you're at risk.   Have a mammogram every 1 to 2 years.  Have a colonoscopy at age 50, or have a yearly FIT test (stool test). These exams screen for colon cancer.    Have a cholesterol test every 5 years, or more often if advised.  Have a diabetes test (fasting glucose) every three years. If you are at risk for diabetes, you should have this test more often.   If you are at risk for osteoporosis (brittle bone disease), think about having a bone density scan (DEXA).    Shots: Get a flu shot each year. Get a tetanus shot every 10 years.    Nutrition:   Eat at least 5 servings of fruits and vegetables each day.  Eat whole-grain bread, whole-wheat pasta and brown rice instead of white grains and rice.  Talk to your provider about Calcium and Vitamin D.        --Good Grains:  Oats, brown rice, Quinoa (these do not raise the blood sugar as much)     --Bad grains:  Anything made from wheat or white rice     (because these raise the blood sugars significantly, and the possible gluten issue from wheat for some people).      --Proteins:  Aim for \"lean proteins\" including chicken, fish, seafood, pork, turkey, and eggs (in moderation); Eat red meat only occasionally    Lifestyle  Exercise at least " 150 minutes a week (30 minutes a day, 5 days a week). This will help you control your weight and prevent disease.  Limit alcohol to one drink per day.  No smoking.   Wear sunscreen to prevent skin cancer.   See your dentist every six months for an exam and cleaning.  See your eye doctor every 1 to 2 years.        Patient Education   Personalized Prevention Plan  You are due for the preventive services outlined below.  Your care team is available to assist you in scheduling these services.  If you have already completed any of these items, please share that information with your care team to update in your medical record.  Health Maintenance Due   Topic Date Due    ANNUAL REVIEW OF HM ORDERS  Never done    LUNG CANCER SCREENING  Never done    Osteoporosis Screening  04/27/2020    Cholesterol Lab  04/06/2022     Your Health Risk Assessment indicates you feel you are not in good emotional health.    Recreation   Recreation is not limited to sports and team events. It includes any activity that provides relaxation, interest, enjoyment, and exercise. Recreation provides an outlet for physical, mental, and social energy. It can give a sense of worth and achievement. It can help you stay healthy.    Mental Exercise and Social Involvement  Mental and emotional health is as important as physical health. Keep in touch with friends and family. Stay as active as possible. Continue to learn and challenge yourself.   Things you can do to stay mentally active are:  Learn something new, like a foreign language or musical instrument.   Play SCRABBLE or do crossword puzzles. If you cannot find people to play these games with you at home, you can play them with others on your computer through the Internet.   Join a games club--anything from card games to chess or checkers or lawn bowling.   Start a new hobby.   Go back to school.   Volunteer.   Read.   Keep up with world events.

## 2022-07-06 NOTE — RESULT ENCOUNTER NOTE
"Your labs looked good.  The LDL \"bad\" cholesterol is just above the normal limit of 130.  Definitely no medications required.  Continue making good food choices as we discussed."

## 2022-07-28 ENCOUNTER — ANCILLARY PROCEDURE (OUTPATIENT)
Dept: BONE DENSITY | Facility: CLINIC | Age: 67
End: 2022-07-28
Attending: INTERNAL MEDICINE
Payer: COMMERCIAL

## 2022-07-28 DIAGNOSIS — M85.80 OSTEOPENIA, UNSPECIFIED LOCATION: ICD-10-CM

## 2022-07-28 DIAGNOSIS — Z78.0 POST-MENOPAUSAL: ICD-10-CM

## 2022-07-28 PROCEDURE — 77085 DXA BONE DENSITY AXL VRT FX: CPT | Performed by: INTERNAL MEDICINE

## 2022-08-17 ENCOUNTER — MYC MEDICAL ADVICE (OUTPATIENT)
Dept: INTERNAL MEDICINE | Facility: CLINIC | Age: 67
End: 2022-08-17

## 2022-08-18 NOTE — TELEPHONE ENCOUNTER
Patient is a previous L2 compression fracture from years ago, therefore I doubt the interpretation of severe osteoporosis is entirely correct.  Will confirm with the patient.

## 2022-09-13 ENCOUNTER — OFFICE VISIT (OUTPATIENT)
Dept: OBGYN | Facility: CLINIC | Age: 67
End: 2022-09-13
Payer: COMMERCIAL

## 2022-09-13 VITALS
BODY MASS INDEX: 19.44 KG/M2 | WEIGHT: 121 LBS | DIASTOLIC BLOOD PRESSURE: 60 MMHG | HEIGHT: 66 IN | SYSTOLIC BLOOD PRESSURE: 112 MMHG | HEART RATE: 76 BPM

## 2022-09-13 DIAGNOSIS — Z01.419 WOMEN'S ANNUAL ROUTINE GYNECOLOGICAL EXAMINATION: Primary | ICD-10-CM

## 2022-09-13 PROCEDURE — 99397 PER PM REEVAL EST PAT 65+ YR: CPT | Performed by: OBSTETRICS & GYNECOLOGY

## 2022-09-13 NOTE — PROGRESS NOTES
Amber is a 67 year old  who presents for annual exam.   Postmenopausal.  She is having no menopausal symptoms. No vaginal bleeding noted.     Besides routine health maintenance, she has no other health concerns today .  GYNECOLOGIC HISTORY:    History sexually transmitted infections:No STD history  Estrogen replacement therapy: Yes -  Vaginal only    History of abnormal Pap smear: NO - age 63 - see link Cervical Cytology Screening Guidelines  Family history of breast CA: No  Family history of uterine/ovarian CA: No      HEALTH MAINTENANCE:  Reviewed.    HISTORY:  OB History    Para Term  AB Living   1 0 0 0 1 0   SAB IAB Ectopic Multiple Live Births   0 1 0 0 0      # Outcome Date GA Lbr Jaylon/2nd Weight Sex Delivery Anes PTL Lv   1 IAB      IAB   DEC     Past Medical History:   Diagnosis Date     Arthritis 2014    Not rheumatoid but osteo - hands and feet     Irritable bowel syndrome     stable     Past Surgical History:   Procedure Laterality Date     COLONOSCOPY  2015     ENT SURGERY  ??    Nasal Polyps/deviated septum repair more than 25 years ago     TUBAL LIGATION  age 28     ZZC NONSPECIFIC PROCEDURE      septoplasty and polypectomy     ZZC NONSPECIFIC PROCEDURE      wisdom teeth     Family History   Problem Relation Age of Onset     Diabetes Mother         B:     Hypertension Mother      Musculoskeletal Disorder Mother         spine problems     Alzheimer Disease Mother 84     Family History Negative Father         B:192     Diabetes Father          21     Psychotic Disorder Brother         bi-polar, alcholism,  @ age 70     Coronary Artery Disease Brother      Diabetes Brother         Lung Cancer     C.A.D. Brother         Aortic aneurysm     Lung Cancer Brother         Stage 4 -  2018     Diabetes Brother         Cardiac? Alcohol addiction     Substance Abuse Brother      Social History     Socioeconomic History     Marital status:      Spouse  name: Michael     Number of children: 0     Years of education: None     Highest education level: None   Occupational History     Employer: NONE    Tobacco Use     Smoking status: Former Smoker     Years: 2.00     Types: Cigarettes     Smokeless tobacco: Never Used     Tobacco comment: Smoked casually during 1980-82   Substance and Sexual Activity     Alcohol use: Not Currently     Drug use: Not Currently     Sexual activity: Yes     Partners: Male     Birth control/protection: Post-menopausal, Female Surgical   Other Topics Concern      Service No     Blood Transfusions No     Caffeine Concern No     Occupational Exposure No     Hobby Hazards No     Sleep Concern No     Stress Concern No     Weight Concern No     Special Diet Yes     Comment: weight watchers     Back Care No     Exercise Yes     Comment: 4-5 days per week     Bike Helmet Yes     Seat Belt Yes     Self-Exams Yes     Parent/sibling w/ CABG, MI or angioplasty before 65F 55M? No   Social History Narrative    Living arrangements - the patient lives with her spouse.        Current Outpatient Medications:      Acetaminophen (TYLENOL EXTRA STRENGTH PO), Take 1 tablet by mouth as needed 2 bid, Disp: , Rfl:      diphenhydrAMINE-acetaminophen (TYLENOL PM)  MG tablet, Take 0.5 tablets by mouth At Bedtime, Disp: , Rfl:      estradiol (VAGIFEM) 10 MCG TABS vaginal tablet, PLACE 1 TABLET VAGINALLY EVERY 3 DAYS, Disp: 24 tablet, Rfl: 0     OXYTROL 3.9 MG/24HR BIW patch, PLACE 1 PATCH ONTO THE SKIN TWICE A WEEK (EVERY 4 DAYS), Disp: 24 patch, Rfl: 8  Allergies   Allergen Reactions     Levaquin Hives     Levaquin induced fixed drug eruption.  Biopsy proven by dermatologist.       Cyclobenzaprine Hcl      drowsiness     Pravastatin Muscle Pain (Myalgia)     Seafood Nausea and Vomiting       Past medical, surgical, social and family history were reviewed and updated in EPIC.    ROS:  12 point review of systems negative other than symptoms noted  "below.      EXAM:  /60   Pulse 76   Ht 1.676 m (5' 6\")   Wt 54.9 kg (121 lb)   Breastfeeding No   BMI 19.53 kg/m     BMI: Body mass index is 19.53 kg/m .  Constitutional: healthy, alert and no distress  Respiratory: Negative.   Breast: No nodularity, asymmetry or nipple discharge bilaterally.  Gastrointestinal: Abdomen soft, non-tender, non-distended. No masses, organomegaly  Vulva:  No external lesions, normal female hair distribution, no inguinal adenopathy.    Urethra:  Midline, non-tender, well supported, no discharge  Vagina:  Atrophic, no abnormal discharge, no lesions  Rectal Exam: deferred  Musculoskeletal: extremities normal  Skin: no suspicious lesions or rashes  Psychiatric: Affect appropriate, cooperative, mentation appears normal.     COUNSELING:   Reviewed preventive health counseling, as reflected in patient instructions       (Beata)menopause management   reports that she has quit smoking. Her smoking use included cigarettes. She quit after 2.00 years of use. She has never used smokeless tobacco.        FRAX Risk Assessment  ASSESSMENT:  67 year old  with satisfactory annual exam  (Z01.419) Women's annual routine gynecological examination  (primary encounter diagnosis)  Comment:   Plan: follow up as needed. No prescriptions today, can refill as needed.    Lolis Solano MD      "

## 2022-09-13 NOTE — NURSING NOTE
"Chief Complaint   Patient presents with     Gyn Exam       Initial /60   Pulse 76   Ht 1.676 m (5' 6\")   Wt 54.9 kg (121 lb)   Breastfeeding No   BMI 19.53 kg/m   Estimated body mass index is 19.53 kg/m  as calculated from the following:    Height as of this encounter: 1.676 m (5' 6\").    Weight as of this encounter: 54.9 kg (121 lb).  BP completed using cuff size: regular    Questioned patient about current smoking habits.  Pt. has never smoked.          The following HM Due: NONE      The following patient reported/Care Every where data was sent to:  P ABSTRACT QUALITY INITIATIVES [[id number]]  Lydia Mcqueen LPN             "

## 2022-09-16 ENCOUNTER — MYC MEDICAL ADVICE (OUTPATIENT)
Dept: INTERNAL MEDICINE | Facility: CLINIC | Age: 67
End: 2022-09-16

## 2022-10-10 ENCOUNTER — HEALTH MAINTENANCE LETTER (OUTPATIENT)
Age: 67
End: 2022-10-10

## 2022-10-10 ENCOUNTER — MYC MEDICAL ADVICE (OUTPATIENT)
Dept: INTERNAL MEDICINE | Facility: CLINIC | Age: 67
End: 2022-10-10

## 2022-10-12 NOTE — TELEPHONE ENCOUNTER
CHUNG already had her flu shot on 9/21/22  It should update automatically when she gets her next covid booster    She does not need Hepatitis B vaccination based on her age, unless she plans to do any sort of medical work.

## 2022-12-19 DIAGNOSIS — N94.10 FEMALE DYSPAREUNIA: ICD-10-CM

## 2022-12-19 RX ORDER — ESTRADIOL 10 UG/1
INSERT VAGINAL
Qty: 24 TABLET | Refills: 2 | Status: SHIPPED | OUTPATIENT
Start: 2022-12-19 | End: 2023-07-24

## 2023-01-24 ENCOUNTER — HOSPITAL ENCOUNTER (OUTPATIENT)
Dept: MAMMOGRAPHY | Facility: CLINIC | Age: 68
Discharge: HOME | End: 2023-01-24
Attending: INTERNAL MEDICINE | Admitting: INTERNAL MEDICINE
Payer: COMMERCIAL

## 2023-01-24 DIAGNOSIS — Z12.31 VISIT FOR SCREENING MAMMOGRAM: ICD-10-CM

## 2023-01-24 PROCEDURE — 77067 SCR MAMMO BI INCL CAD: CPT

## 2023-07-24 DIAGNOSIS — N94.10 FEMALE DYSPAREUNIA: ICD-10-CM

## 2023-07-24 RX ORDER — ESTRADIOL 10 UG/1
INSERT VAGINAL
Qty: 24 TABLET | Refills: 0 | Status: SHIPPED | OUTPATIENT
Start: 2023-07-24 | End: 2023-11-16

## 2023-07-24 NOTE — TELEPHONE ENCOUNTER
Prescription approved per [facility] Refill Protocol.  Patient advised to reschedule yearly appointment in 09/2023.    Madonna DIAZ RN

## 2023-10-29 ENCOUNTER — HEALTH MAINTENANCE LETTER (OUTPATIENT)
Age: 68
End: 2023-10-29

## 2023-11-16 ENCOUNTER — OFFICE VISIT (OUTPATIENT)
Dept: OBGYN | Facility: CLINIC | Age: 68
End: 2023-11-16
Payer: MEDICARE

## 2023-11-16 VITALS — WEIGHT: 127 LBS | SYSTOLIC BLOOD PRESSURE: 108 MMHG | DIASTOLIC BLOOD PRESSURE: 58 MMHG | BODY MASS INDEX: 20.5 KG/M2

## 2023-11-16 DIAGNOSIS — Z01.419 WOMEN'S ANNUAL ROUTINE GYNECOLOGICAL EXAMINATION: ICD-10-CM

## 2023-11-16 DIAGNOSIS — N95.2 ATROPHIC VAGINITIS: Primary | ICD-10-CM

## 2023-11-16 DIAGNOSIS — N94.10 FEMALE DYSPAREUNIA: ICD-10-CM

## 2023-11-16 PROCEDURE — 99397 PER PM REEVAL EST PAT 65+ YR: CPT | Performed by: OBSTETRICS & GYNECOLOGY

## 2023-11-16 RX ORDER — ESTRADIOL 10 UG/1
INSERT VAGINAL
Qty: 24 TABLET | Refills: 3 | Status: SHIPPED | OUTPATIENT
Start: 2023-11-16 | End: 2024-08-27

## 2023-11-16 NOTE — NURSING NOTE
"Chief Complaint   Patient presents with    Gyn Exam     Breast and pelvic exam       Initial /58   Wt 57.6 kg (127 lb)   BMI 20.50 kg/m   Estimated body mass index is 20.5 kg/m  as calculated from the following:    Height as of 22: 1.676 m (5' 6\").    Weight as of this encounter: 57.6 kg (127 lb).  BP completed using cuff size: regular    Questioned patient about current smoking habits.  Pt. quit smoking some time ago.          The following HM Due: NONE             "

## 2023-11-16 NOTE — PROGRESS NOTES
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 Please see my last note for complete details.    This has been well managed with Vagifem every 3 to 4 days.  There are no changes to her past medical or surgical history, no changes to her family history which were all reviewed today.  She would like a refill for the Vagifem.  Unfortunately, her  is in late stage cancer treatment, so she has had some increased stressors.  She wants to know when she is due for repeat DEXA scan so we did look that up and she is due in .      Problem list and histories reviewed & adjusted, as indicated.  Additional history: as documented.    Patient Active Problem List   Diagnosis    Irritable bowel syndrome    Family history of coronary artery disease    Dyspareunia    Bladder spasms    Atrophic vaginitis    Internal hemorrhoid    Right ovarian cyst     Past Surgical History:   Procedure Laterality Date    COLONOSCOPY  2015    ENT SURGERY  ??    Nasal Polyps/deviated septum repair more than 25 years ago    TUBAL LIGATION  age 28    [facility] NONSPECIFIC PROCEDURE      septoplasty and polypectomy    [facility] NONSPECIFIC PROCEDURE      wisdom teeth      Social History     Tobacco Use    Smoking status: Former     Years: 2     Types: Cigarettes    Smokeless tobacco: Never    Tobacco comments:     Smoked casually during    Substance Use Topics    Alcohol use: Not Currently      Problem (# of Occurrences) Relation (Name,Age of Onset)    Substance Abuse (1) Brother (Manuel)    Alzheimer Disease (1) Mother (Lynd, 84)    Diabetes (4) Mother (Carly): B:1927, Father (MDS):  21, Brother (Angus): Lung Cancer, Brother (Manuel): Cardiac? Alcohol addiction    Hypertension (1) Mother (Carly)    Musculoskeletal Disorder (1) Mother (Lynd): spine  problems    Psychotic Disorder (1) Brother (Angus): bi-polar, alcholism,  @ age 70    Family History Negative (1) Father (MDS): B:1927    C.A.D. (1) Brother: Aortic aneurysm    Coronary Artery Disease (1) Brother (Angus)    Lung Cancer (1) Brother: Stage 4 -  2018              Acetaminophen (TYLENOL EXTRA STRENGTH PO), Take 1 tablet by mouth as needed 2 bid  OXYTROL 3.9 MG/24HR BIW patch, PLACE 1 PATCH ONTO THE SKIN TWICE A WEEK (EVERY 4 DAYS)  diphenhydrAMINE-acetaminophen (TYLENOL PM)  MG tablet, Take 0.5 tablets by mouth At Bedtime    No current facility-administered medications on file prior to visit.    Allergies   Allergen Reactions    Levaquin Hives     Levaquin induced fixed drug eruption.  Biopsy proven by dermatologist.      Cyclobenzaprine Hcl      drowsiness    Pravastatin Muscle Pain (Myalgia)    Seafood Nausea and Vomiting       ROS:  Negative except as noted in HPI.    OBJECTIVE:     General: Appears well and is in no distress.    Breasts: Symmetric bilaterally, no skin changes noted, no dominant masses bilaterally, no nipple discharge, and supra and infraclavicular nodes as well as axillary nodes are negative bilaterally.    Pelvis: EGBUS notable for changes consistent with vulvovaginal atrophy, but no lesions are noted.  Urethral meatus is normal.      In-Clinic Test Results:  No results found for this or any previous visit (from the past 24 hour(s)).    ASSESSMENT/PLAN:                                                        ICD-10-CM    1. Atrophic vaginitis  N95.2       2. Female dyspareunia  N94.10 estradiol (VAGIFEM) 10 MCG TABS vaginal tablet      3. Women's annual routine gynecological examination  Z01.419             -Follow-up in 1 year or sooner with concerns.  -Vagifem renewed for 1 year.  -Discussed her low back pain.  This does seem to be better when she gets more exercise, but that has been difficult with her 's diagnosis.  She will follow-up with her primary  care provider if this persists.    Lolis Solano MD  [facility]

## 2024-01-29 ENCOUNTER — ANCILLARY PROCEDURE (OUTPATIENT)
Dept: MAMMOGRAPHY | Facility: CLINIC | Age: 69
End: 2024-01-29
Attending: INTERNAL MEDICINE
Payer: MEDICARE

## 2024-01-29 DIAGNOSIS — Z12.31 VISIT FOR SCREENING MAMMOGRAM: ICD-10-CM

## 2024-01-29 PROCEDURE — 77067 SCR MAMMO BI INCL CAD: CPT | Mod: TC | Performed by: RADIOLOGY

## 2024-01-29 PROCEDURE — 77063 BREAST TOMOSYNTHESIS BI: CPT | Mod: TC | Performed by: RADIOLOGY

## 2024-08-27 DIAGNOSIS — N94.10 FEMALE DYSPAREUNIA: ICD-10-CM

## 2024-08-27 RX ORDER — ESTRADIOL 10 UG/1
TABLET VAGINAL
Qty: 24 TABLET | Refills: 0 | Status: SHIPPED | OUTPATIENT
Start: 2024-08-27

## 2024-08-27 NOTE — TELEPHONE ENCOUNTER
Prescription approved per [facility] Refill Protocol.  Due for an appt 11/2024.    Meena PHIPPS RN  [facility]

## 2024-12-21 ENCOUNTER — HEALTH MAINTENANCE LETTER (OUTPATIENT)
Age: 69
End: 2024-12-21

## 2025-02-11 ENCOUNTER — HOSPITAL ENCOUNTER (OUTPATIENT)
Dept: MAMMOGRAPHY | Facility: CLINIC | Age: 70
Discharge: HOME | End: 2025-02-11
Attending: INTERNAL MEDICINE
Payer: MEDICARE

## 2025-02-11 DIAGNOSIS — Z12.31 VISIT FOR SCREENING MAMMOGRAM: ICD-10-CM

## 2025-02-11 PROCEDURE — 77063 BREAST TOMOSYNTHESIS BI: CPT

## 2025-02-11 PROCEDURE — 77067 SCR MAMMO BI INCL CAD: CPT

## 2025-04-12 ENCOUNTER — HEALTH MAINTENANCE LETTER (OUTPATIENT)
Age: 70
End: 2025-04-12